# Patient Record
Sex: FEMALE | Race: WHITE | NOT HISPANIC OR LATINO | Employment: FULL TIME | URBAN - METROPOLITAN AREA
[De-identification: names, ages, dates, MRNs, and addresses within clinical notes are randomized per-mention and may not be internally consistent; named-entity substitution may affect disease eponyms.]

---

## 2017-04-04 ENCOUNTER — HOSPITAL ENCOUNTER (OUTPATIENT)
Dept: EMERGENCY MEDICINE | Facility: HOSPITAL | Age: 42
End: 2017-04-05
Attending: INTERNAL MEDICINE | Admitting: INTERNAL MEDICINE

## 2017-08-14 ENCOUNTER — HISTORICAL (OUTPATIENT)
Dept: WOUND CARE | Facility: HOSPITAL | Age: 42
End: 2017-08-14

## 2017-08-14 LAB
APPEARANCE, UA: CLEAR
BACTERIA #/AREA URNS AUTO: ABNORMAL /[HPF]
BILIRUB UR QL STRIP: NEGATIVE
COLOR UR: YELLOW
GLUCOSE (UA): NORMAL
HAV IGM SERPL QL IA: NONREACTIVE
HBV CORE IGM SERPL QL IA: NONREACTIVE
HBV SURFACE AG SERPL QL IA: NEGATIVE
HCV AB SERPL QL IA: NONREACTIVE
HGB UR QL STRIP: 1 MG/DL
HIV 1+2 AB+HIV1 P24 AG SERPL QL IA: NONREACTIVE
HYALINE CASTS #/AREA URNS LPF: ABNORMAL /[LPF]
KETONES UR QL STRIP: NEGATIVE
LEUKOCYTE ESTERASE UR QL STRIP: 250 LEU/UL
NITRITE UR QL STRIP: NEGATIVE
PH UR STRIP: 6.5 [PH] (ref 4.5–8)
PROT UR QL STRIP: 10 MG/DL
RBC #/AREA URNS AUTO: ABNORMAL /[HPF]
SP GR UR STRIP: 1.02 (ref 1–1.03)
SQUAMOUS #/AREA URNS LPF: >100 /[LPF]
T PALLIDUM AB SER QL: NONREACTIVE
UROBILINOGEN UR STRIP-ACNC: NORMAL
WBC #/AREA URNS AUTO: ABNORMAL /HPF

## 2017-08-20 LAB
COLOR STL: NORMAL
CONSISTENCY STL: NORMAL
HEMOCCULT SP1 STL QL: NEGATIVE

## 2017-08-23 LAB
COLOR STL: NORMAL
CONSISTENCY STL: NORMAL
HEMOCCULT SP2 STL QL: NEGATIVE

## 2017-08-24 ENCOUNTER — HISTORICAL (OUTPATIENT)
Dept: WOUND CARE | Facility: HOSPITAL | Age: 42
End: 2017-08-24

## 2017-08-24 LAB
COLOR STL: NORMAL
CONSISTENCY STL: NORMAL

## 2017-09-01 ENCOUNTER — HISTORICAL (OUTPATIENT)
Dept: WOUND CARE | Facility: HOSPITAL | Age: 42
End: 2017-09-01

## 2017-09-07 ENCOUNTER — HISTORICAL (OUTPATIENT)
Dept: WOUND CARE | Facility: HOSPITAL | Age: 42
End: 2017-09-07

## 2017-09-07 LAB
APPEARANCE, UA: ABNORMAL
BACTERIA #/AREA URNS AUTO: ABNORMAL /[HPF]
BILIRUB UR QL STRIP: NEGATIVE
COLOR UR: YELLOW
GLUCOSE (UA): NORMAL
HGB UR QL STRIP: 0.06 MG/DL
HYALINE CASTS #/AREA URNS LPF: ABNORMAL /[LPF]
KETONES UR QL STRIP: NEGATIVE
LEUKOCYTE ESTERASE UR QL STRIP: 500 LEU/UL
NITRITE UR QL STRIP: NEGATIVE
PH UR STRIP: 6 [PH] (ref 4.5–8)
PROT UR QL STRIP: NEGATIVE
RBC #/AREA URNS AUTO: ABNORMAL /[HPF]
SP GR UR STRIP: 1.01 (ref 1–1.03)
SQUAMOUS #/AREA URNS LPF: >100 /[LPF]
UROBILINOGEN UR STRIP-ACNC: NORMAL
WBC #/AREA URNS AUTO: ABNORMAL /HPF

## 2017-09-10 LAB — FINAL CULTURE: NORMAL

## 2017-09-18 ENCOUNTER — HISTORICAL (OUTPATIENT)
Dept: ADMINISTRATIVE | Facility: HOSPITAL | Age: 42
End: 2017-09-18

## 2017-09-18 LAB
APPEARANCE, UA: CLEAR
BACTERIA #/AREA URNS AUTO: ABNORMAL /[HPF]
BILIRUB SERPL-MCNC: NEGATIVE MG/DL
BILIRUB UR QL STRIP: NEGATIVE
BLOOD URINE, POC: NEGATIVE
CLARITY, POC UA: CLEAR
COLOR UR: NORMAL
COLOR, POC UA: NORMAL
GLUCOSE (UA): NORMAL
GLUCOSE UR QL STRIP: NEGATIVE
HGB UR QL STRIP: NEGATIVE
HYALINE CASTS #/AREA URNS LPF: ABNORMAL /[LPF]
KETONES UR QL STRIP: NEGATIVE
KETONES UR QL STRIP: NEGATIVE
LEUKOCYTE EST, POC UA: NEGATIVE
LEUKOCYTE ESTERASE UR QL STRIP: NEGATIVE
NITRITE UR QL STRIP: NEGATIVE
NITRITE, POC UA: NEGATIVE
PH UR STRIP: 7.5 [PH] (ref 4.5–8)
PH, POC UA: 6
PROT UR QL STRIP: NEGATIVE
PROTEIN, POC: NEGATIVE
RBC #/AREA URNS AUTO: ABNORMAL /[HPF]
SP GR UR STRIP: 1.01 (ref 1–1.03)
SPECIFIC GRAVITY, POC UA: 1
SQUAMOUS #/AREA URNS LPF: ABNORMAL /[LPF]
UROBILINOGEN UR STRIP-ACNC: NORMAL
UROBILINOGEN, POC UA: NORMAL
WBC #/AREA URNS AUTO: ABNORMAL /HPF

## 2017-09-20 LAB — FINAL CULTURE: NO GROWTH

## 2017-09-21 ENCOUNTER — HISTORICAL (OUTPATIENT)
Dept: WOUND CARE | Facility: HOSPITAL | Age: 42
End: 2017-09-21

## 2017-09-21 LAB — FSH SERPL-ACNC: 22.1 MIU/ML

## 2017-09-24 LAB
BILIRUB SERPL-MCNC: NEGATIVE MG/DL
BLOOD URINE, POC: NORMAL
CLARITY, POC UA: CLEAR
COLOR, POC UA: NORMAL
GLUCOSE UR QL STRIP: NEGATIVE
KETONES UR QL STRIP: NEGATIVE
LEUKOCYTE EST, POC UA: NORMAL
NITRITE, POC UA: POSITIVE
PH, POC UA: 6
PROTEIN, POC: NORMAL
SPECIFIC GRAVITY, POC UA: 1.01
UROBILINOGEN, POC UA: NORMAL

## 2017-12-11 ENCOUNTER — HISTORICAL (OUTPATIENT)
Dept: INTERNAL MEDICINE | Facility: CLINIC | Age: 42
End: 2017-12-11

## 2017-12-11 LAB
ABS NEUT (OLG): 3.2 X10(3)/MCL (ref 2.1–9.2)
ALBUMIN SERPL-MCNC: 3.7 GM/DL (ref 3.4–5)
ALBUMIN/GLOB SERPL: 1 RATIO (ref 1–2)
ALP SERPL-CCNC: 57 UNIT/L (ref 45–117)
ALT SERPL-CCNC: 29 UNIT/L (ref 12–78)
AST SERPL-CCNC: 15 UNIT/L (ref 15–37)
BASOPHILS # BLD AUTO: 0.05 X10(3)/MCL
BASOPHILS NFR BLD AUTO: 1 % (ref 0–1)
BILIRUB SERPL-MCNC: 0.5 MG/DL (ref 0.2–1)
BILIRUBIN DIRECT+TOT PNL SERPL-MCNC: 0.1 MG/DL
BILIRUBIN DIRECT+TOT PNL SERPL-MCNC: 0.4 MG/DL
BUN SERPL-MCNC: 15 MG/DL (ref 7–18)
CALCIUM SERPL-MCNC: 8.8 MG/DL (ref 8.5–10.1)
CHLORIDE SERPL-SCNC: 108 MMOL/L (ref 98–107)
CHOLEST SERPL-MCNC: 150 MG/DL
CHOLEST/HDLC SERPL: 3.3 {RATIO} (ref 0–4.4)
CO2 SERPL-SCNC: 25 MMOL/L (ref 21–32)
CREAT SERPL-MCNC: 0.9 MG/DL (ref 0.6–1.3)
EOSINOPHIL # BLD AUTO: 0.14 10*3/UL
EOSINOPHIL NFR BLD AUTO: 2 % (ref 0–5)
ERYTHROCYTE [DISTWIDTH] IN BLOOD BY AUTOMATED COUNT: 12 % (ref 11.5–14.5)
EST. AVERAGE GLUCOSE BLD GHB EST-MCNC: 123 MG/DL
GLOBULIN SER-MCNC: 3.4 GM/ML (ref 2.3–3.5)
GLUCOSE SERPL-MCNC: 122 MG/DL (ref 74–106)
HBA1C MFR BLD: 5.9 % (ref 4.2–6.3)
HCT VFR BLD AUTO: 41.1 % (ref 35–46)
HDLC SERPL-MCNC: 45 MG/DL
HGB BLD-MCNC: 14 GM/DL (ref 12–16)
IMM GRANULOCYTES # BLD AUTO: 0.01 10*3/UL
IMM GRANULOCYTES NFR BLD AUTO: 0 %
LDLC SERPL CALC-MCNC: 85 MG/DL (ref 0–130)
LYMPHOCYTES # BLD AUTO: 3.03 X10(3)/MCL
LYMPHOCYTES NFR BLD AUTO: 44 % (ref 15–40)
MCH RBC QN AUTO: 31 PG (ref 26–34)
MCHC RBC AUTO-ENTMCNC: 34.1 GM/DL (ref 31–37)
MCV RBC AUTO: 90.9 FL (ref 80–100)
MONOCYTES # BLD AUTO: 0.48 X10(3)/MCL
MONOCYTES NFR BLD AUTO: 7 % (ref 4–12)
NEUTROPHILS # BLD AUTO: 3.2 X10(3)/MCL
NEUTROPHILS NFR BLD AUTO: 46 X10(3)/MCL
PLATELET # BLD AUTO: 192 X10(3)/MCL (ref 130–400)
PMV BLD AUTO: 10.6 FL (ref 7.4–10.4)
POTASSIUM SERPL-SCNC: 4 MMOL/L (ref 3.5–5.1)
PROT SERPL-MCNC: 7.1 GM/DL (ref 6.4–8.2)
RBC # BLD AUTO: 4.52 X10(6)/MCL (ref 4–5.2)
SODIUM SERPL-SCNC: 142 MMOL/L (ref 136–145)
TRIGL SERPL-MCNC: 100 MG/DL
VLDLC SERPL CALC-MCNC: 20 MG/DL
WBC # SPEC AUTO: 6.9 X10(3)/MCL (ref 4.5–11)

## 2018-02-26 ENCOUNTER — HISTORICAL (OUTPATIENT)
Dept: ADMINISTRATIVE | Facility: HOSPITAL | Age: 43
End: 2018-02-26

## 2018-02-26 LAB
APPEARANCE, UA: CLEAR
BACTERIA #/AREA URNS AUTO: ABNORMAL /[HPF]
BILIRUB SERPL-MCNC: NEGATIVE MG/DL
BILIRUB UR QL STRIP: NEGATIVE
BLOOD URINE, POC: NORMAL
CLARITY, POC UA: CLEAR
COLOR UR: ABNORMAL
COLOR, POC UA: YELLOW
GLUCOSE (UA): NORMAL
GLUCOSE UR QL STRIP: NEGATIVE
HGB UR QL STRIP: 0.03 MG/DL
HYALINE CASTS #/AREA URNS LPF: ABNORMAL /[LPF]
KETONES UR QL STRIP: ABNORMAL
KETONES UR QL STRIP: NEGATIVE
LEUKOCYTE EST, POC UA: NORMAL
LEUKOCYTE ESTERASE UR QL STRIP: 25 LEU/UL
NITRITE UR QL STRIP: NEGATIVE
NITRITE, POC UA: NEGATIVE
PH UR STRIP: 6.5 [PH] (ref 4.5–8)
PH, POC UA: 7
PROT UR QL STRIP: NEGATIVE
PROTEIN, POC: NEGATIVE
RBC #/AREA URNS AUTO: ABNORMAL /[HPF]
SP GR UR STRIP: 1.01 (ref 1–1.03)
SPECIFIC GRAVITY, POC UA: 1.02
SQUAMOUS #/AREA URNS LPF: ABNORMAL /[LPF]
UROBILINOGEN UR STRIP-ACNC: NORMAL
UROBILINOGEN, POC UA: NORMAL
WBC #/AREA URNS AUTO: ABNORMAL /HPF

## 2018-02-28 LAB — FINAL CULTURE: NORMAL

## 2018-05-23 LAB — POC BETA-HCG (QUAL): NEGATIVE

## 2018-09-20 ENCOUNTER — HISTORICAL (OUTPATIENT)
Dept: ADMINISTRATIVE | Facility: HOSPITAL | Age: 43
End: 2018-09-20

## 2018-09-20 LAB
AMPHET UR QL SCN: NEGATIVE
BARBITURATE SCN PRESENT UR: NEGATIVE
BENZODIAZ UR QL SCN: NEGATIVE
CANNABINOIDS UR QL SCN: NEGATIVE
COCAINE UR QL SCN: NEGATIVE
OPIATES UR QL SCN: NEGATIVE
PCP UR QL: NEGATIVE
PH UR STRIP.AUTO: 6 [PH] (ref 5–8)
TEMPERATURE, URINE (OHS): 21.8 DEGC (ref 20–25)

## 2019-01-14 ENCOUNTER — HISTORICAL (OUTPATIENT)
Dept: ADMINISTRATIVE | Facility: HOSPITAL | Age: 44
End: 2019-01-14

## 2019-01-14 LAB
FSH SERPL-ACNC: 12.4 MIU/ML
POC BETA-HCG (QUAL): NEGATIVE

## 2019-01-29 ENCOUNTER — HISTORICAL (OUTPATIENT)
Dept: WOUND CARE | Facility: HOSPITAL | Age: 44
End: 2019-01-29

## 2019-01-29 LAB — B-HCG SERPL QL: NEGATIVE

## 2020-07-10 ENCOUNTER — HISTORICAL (OUTPATIENT)
Dept: ADMINISTRATIVE | Facility: HOSPITAL | Age: 45
End: 2020-07-10

## 2020-07-13 LAB — FINAL CULTURE: NO GROWTH

## 2020-09-25 ENCOUNTER — HISTORICAL (OUTPATIENT)
Dept: ADMINISTRATIVE | Facility: HOSPITAL | Age: 45
End: 2020-09-25

## 2020-09-28 LAB — FINAL CULTURE: NO GROWTH

## 2020-10-05 ENCOUNTER — HISTORICAL (OUTPATIENT)
Dept: ADMINISTRATIVE | Facility: HOSPITAL | Age: 45
End: 2020-10-05

## 2020-10-08 LAB — FINAL CULTURE: NO GROWTH

## 2021-03-12 ENCOUNTER — HISTORICAL (OUTPATIENT)
Dept: LAB | Facility: HOSPITAL | Age: 46
End: 2021-03-12

## 2021-03-12 LAB
ABS NEUT (OLG): 2.74 X10(3)/MCL (ref 2.1–9.2)
ALBUMIN SERPL-MCNC: 4.4 GM/DL (ref 3.5–5)
ALBUMIN/GLOB SERPL: 1.4 RATIO (ref 1.1–2)
ALP SERPL-CCNC: 62 UNIT/L (ref 40–150)
ALT SERPL-CCNC: 21 UNIT/L (ref 0–55)
APPEARANCE, UA: CLEAR
AST SERPL-CCNC: 16 UNIT/L (ref 5–34)
BACTERIA SPEC CULT: ABNORMAL /HPF
BASOPHILS # BLD AUTO: 0.06 X10(3)/MCL (ref 0–0.2)
BASOPHILS NFR BLD AUTO: 1 % (ref 0–1)
BILIRUB SERPL-MCNC: 0.6 MG/DL (ref 0.2–1.2)
BILIRUB UR QL STRIP: NEGATIVE
BILIRUBIN DIRECT+TOT PNL SERPL-MCNC: 0.2 MG/DL (ref 0–0.5)
BILIRUBIN DIRECT+TOT PNL SERPL-MCNC: 0.4 MG/DL (ref 0–0.8)
BUN SERPL-MCNC: 10.8 MG/DL (ref 7–18.7)
CALCIUM SERPL-MCNC: 9.6 MG/DL (ref 8.4–10.2)
CHLORIDE SERPL-SCNC: 106 MMOL/L (ref 98–107)
CHOLEST SERPL-MCNC: 178 MG/DL
CHOLEST/HDLC SERPL: 4 {RATIO} (ref 0–5)
CO2 SERPL-SCNC: 27 MMOL/L (ref 22–29)
COLOR UR: ABNORMAL
CREAT SERPL-MCNC: 0.9 MG/DL (ref 0.57–1.11)
CREAT UR-MCNC: 90.9 MG/DL (ref 45–106)
DEPRECATED CALCIDIOL+CALCIFEROL SERPL-MC: 53.6 NG/ML (ref 30–80)
EOSINOPHIL # BLD AUTO: 0.08 X10(3)/MCL (ref 0–0.9)
EOSINOPHIL NFR BLD AUTO: 1.3 % (ref 0–6.4)
ERYTHROCYTE [DISTWIDTH] IN BLOOD BY AUTOMATED COUNT: 12.1 % (ref 11.5–17)
EST. AVERAGE GLUCOSE BLD GHB EST-MCNC: 131.2 MG/DL
GLOBULIN SER-MCNC: 3.1 GM/DL (ref 2.4–3.5)
GLUCOSE (UA): NEGATIVE
GLUCOSE SERPL-MCNC: 119 MG/DL (ref 74–100)
HBA1C MFR BLD: 6.2 %
HCT VFR BLD AUTO: 43.3 % (ref 37–47)
HDLC SERPL-MCNC: 40 MG/DL (ref 40–60)
HGB BLD-MCNC: 14.4 GM/DL (ref 12–16)
HGB UR QL STRIP: ABNORMAL
IMM GRANULOCYTES # BLD AUTO: 0.01 10*3/UL (ref 0–0.02)
IMM GRANULOCYTES NFR BLD AUTO: 0.2 % (ref 0–0.43)
KETONES UR QL STRIP: NEGATIVE
LDLC SERPL CALC-MCNC: 122 MG/DL (ref 50–140)
LEUKOCYTE ESTERASE UR QL STRIP: ABNORMAL
LYMPHOCYTES # BLD AUTO: 2.71 X10(3)/MCL (ref 0.6–4.6)
LYMPHOCYTES NFR BLD AUTO: 45.5 % (ref 16–44)
MCH RBC QN AUTO: 30.4 PG (ref 27–31)
MCHC RBC AUTO-ENTMCNC: 33.3 GM/DL (ref 33–36)
MCV RBC AUTO: 91.5 FL (ref 80–94)
MICROALBUMIN UR-MCNC: <5 UG/ML
MICROALBUMIN/CREAT RATIO PNL UR: <5.5 MG/GM CR (ref 0–30)
MONOCYTES # BLD AUTO: 0.36 X10(3)/MCL (ref 0.1–1.3)
MONOCYTES NFR BLD AUTO: 6 % (ref 4–12.1)
MUCOUS THREADS URNS QL MICRO: ABNORMAL /LPF
NEUTROPHILS # BLD AUTO: 2.74 X10(3)/MCL (ref 2.1–9.2)
NEUTROPHILS NFR BLD AUTO: 46 % (ref 43–73)
NITRITE UR QL STRIP: NEGATIVE
NRBC BLD AUTO-RTO: 0 % (ref 0–0.2)
PH UR STRIP: 7 [PH] (ref 5–7)
PLATELET # BLD AUTO: 224 X10(3)/MCL (ref 130–400)
PMV BLD AUTO: 10.1 FL (ref 7.4–10.4)
POTASSIUM SERPL-SCNC: 3.9 MMOL/L (ref 3.5–5.1)
PROT SERPL-MCNC: 7.5 GM/DL (ref 6.4–8.3)
PROT UR QL STRIP: NEGATIVE
RBC # BLD AUTO: 4.73 X10(6)/MCL (ref 4.2–5.4)
RBC #/AREA URNS HPF: ABNORMAL /HPF
SODIUM SERPL-SCNC: 140 MMOL/L (ref 136–145)
SP GR UR STRIP: 1.02 (ref 1–1.03)
SQUAMOUS EPITHELIAL, UA: ABNORMAL /LPF
TRIGL SERPL-MCNC: 80 MG/DL (ref 0–150)
TSH SERPL-ACNC: 1.46 UIU/ML (ref 0.35–4.94)
UROBILINOGEN UR STRIP-ACNC: NEGATIVE
VLDLC SERPL CALC-MCNC: 16 MG/DL
WBC # SPEC AUTO: 6 X10(3)/MCL (ref 4.5–11.5)
WBC #/AREA URNS HPF: ABNORMAL /HPF

## 2021-03-14 LAB — FINAL CULTURE: NORMAL

## 2021-06-22 ENCOUNTER — HISTORICAL (OUTPATIENT)
Dept: ADMINISTRATIVE | Facility: HOSPITAL | Age: 46
End: 2021-06-22

## 2021-06-22 LAB
APPEARANCE, UA: ABNORMAL
BACTERIA SPEC CULT: ABNORMAL /HPF
BILIRUB SERPL-MCNC: NEGATIVE MG/DL
BILIRUB UR QL STRIP: NEGATIVE
BLOOD URINE, POC: NORMAL
CLARITY, POC UA: NORMAL
COLOR UR: YELLOW
COLOR, POC UA: NORMAL
GLUCOSE (UA): NEGATIVE
GLUCOSE UR QL STRIP: NEGATIVE
HGB UR QL STRIP: ABNORMAL
KETONES UR QL STRIP: ABNORMAL
KETONES UR QL STRIP: NEGATIVE
LEUKOCYTE EST, POC UA: NORMAL
LEUKOCYTE ESTERASE UR QL STRIP: ABNORMAL
NITRITE UR QL STRIP: POSITIVE
NITRITE, POC UA: POSITIVE
PH UR STRIP: 6 [PH] (ref 5–9)
PH, POC UA: 5
PROT UR QL STRIP: ABNORMAL
PROTEIN, POC: NORMAL
RBC #/AREA URNS HPF: ABNORMAL /HPF
SP GR UR STRIP: >1.03 (ref 1–1.03)
SPECIFIC GRAVITY, POC UA: 1.03
SQUAMOUS EPITHELIAL, UA: ABNORMAL /HPF
UROBILINOGEN UR STRIP-ACNC: 0.2
UROBILINOGEN, POC UA: NORMAL
WBC #/AREA URNS HPF: ABNORMAL /HPF

## 2021-06-25 LAB
HPV16+18+H RISK 12 DNA CVX-IMP: NEGATIVE
PAP RECOMMENDATION EXT: NORMAL
PAP SMEAR: NORMAL

## 2021-07-03 LAB
BILIRUB SERPL-MCNC: NORMAL MG/DL
BLOOD URINE, POC: NORMAL
CLARITY, POC UA: NORMAL
COLOR, POC UA: NORMAL
GLUCOSE UR QL STRIP: NEGATIVE
KETONES UR QL STRIP: NEGATIVE
LEUKOCYTE EST, POC UA: NORMAL
NITRITE, POC UA: POSITIVE
PH, POC UA: 6
PROTEIN, POC: NEGATIVE
SPECIFIC GRAVITY, POC UA: 1.01
UROBILINOGEN, POC UA: NORMAL

## 2021-10-13 ENCOUNTER — HISTORICAL (OUTPATIENT)
Dept: RADIOLOGY | Facility: HOSPITAL | Age: 46
End: 2021-10-13

## 2022-04-11 ENCOUNTER — HISTORICAL (OUTPATIENT)
Dept: ADMINISTRATIVE | Facility: HOSPITAL | Age: 47
End: 2022-04-11
Payer: COMMERCIAL

## 2022-04-28 VITALS
OXYGEN SATURATION: 100 % | HEIGHT: 67 IN | DIASTOLIC BLOOD PRESSURE: 87 MMHG | WEIGHT: 244.19 LBS | BODY MASS INDEX: 38.33 KG/M2 | SYSTOLIC BLOOD PRESSURE: 138 MMHG | DIASTOLIC BLOOD PRESSURE: 84 MMHG | OXYGEN SATURATION: 99 % | WEIGHT: 240.31 LBS | SYSTOLIC BLOOD PRESSURE: 136 MMHG | BODY MASS INDEX: 37.72 KG/M2 | WEIGHT: 246.69 LBS | DIASTOLIC BLOOD PRESSURE: 72 MMHG | HEIGHT: 67 IN | SYSTOLIC BLOOD PRESSURE: 122 MMHG | HEIGHT: 67 IN | BODY MASS INDEX: 38.72 KG/M2

## 2022-04-30 NOTE — PROGRESS NOTES
Patient:   Karen Tapia             MRN: [id number]            FIN: [id number]               Age:   42 years     Sex:  Female     :  1975   Associated Diagnoses:   None   Author:   Griselda Edwards MD      Attempt made to call pt regarding FSH which shows she is not post-menopausal. There was no answer. Message left with call back number.     Griselda Edwards MD [id number]   Pager [phone]

## 2022-05-04 NOTE — HISTORICAL [facility]
This is a historical note converted from Jeri. Formatting and pictures may have been removed.  Please reference Jeri for original formatting and attached multimedia. Chief Complaint  blood in urine, has family history of cancer  History of Present Illness  43yo  presents as referral for recurrent microscopic ?hematuria on clean catch UA?for the past two years. Of note pt reports no bladder pan, dysuria or incontinence. She has DTF 15 x per day and NTF 2-3 times per day. Reports that urine is sometimes dark. She has a remote hx of abnormal pap smears. ??She is amenorrhic on Depo for many years and is not sexuallya ctive, endosring no vaginal pain. Of note, u dip today is negative for blood. Pt is a non smoker and? denies any family hx of breast, uterine,?or bladder cancer. ?Family hx of colon cancer in sister from prior note but today reports that it was her mother who had cancer, the type of which she is uncertain but she thinks it may have been ovarian. Reports occasional hot flashes and that her sister and mom went through menopause in their 40s.  ?   Mhx: Chronic back pain, DM, HTN  Sx: CS x 3  Current meds: Depo  Social: Denies x 3, works as .  Allergies: Codine.  OB: CS x 3, eldest child is .  ?  Review of Systems  Review of Systems  Constitutional: No fever, No chills.  Respiratory: No shortness of breath.  Cardiovascular: No chest pain, No syncope.  Gastrointestinal: No nausea, No vomiting, No diarrhea, No constipation.  Genitourinary: No dysuria, No abnormal discharge or bleeding.  Neurologic: Alert and oriented X4.  Physical Exam  General: Alert and oriented, No acute distress.  Breast: No mass, No tenderness, No discharge.  Gastrointestinal: Soft, Non-tender.  Gynecology:  There is no vaginal, trigonal or vestibular pain.  Labia: Bilateral, Majora, Minora, Within normal limits.  Vagina: Within normal limits. clear discharge present  Cervix: agglutinated at the top of the  vagina.  Uterus: Mobile, Not tender.  Ovaries: Not tender, No mass.  No evidence of prolapse.  Integumentary: Warm, Dry.  Neurologic: Alert, Oriented.  Psychiatric: Cooperative, Appropriate mood & affect.  Assessment/Plan  1.?Hematuria  ? No evidence of hematuria today and prior UAs have been clean catch. Today, cath specimen was collected. If positive for blood on UA, needs CT and cystoscopy. If negative, likely needs estrogenization of the urethral and?vaginal tissue.? Discussed lifestyle modifications for urinary frequency as pt is not terribly bothered by it.  2.?Vaginal atrophy  ? Cervix is agglutinated and vaginal?mucosa strophic; suspect that pt my be?menopausal. Will order FSH to confirm as if she is, needs to discontinue depo provera for maintence of bone density. Will start premarin 3 x per week x 2 weeks and then 2 times weekly thereafter. Pt may RTC prn. ??   Problem List/Past Medical History  Ongoing  Obesity  Historical  Diabetes  Procedure/Surgical History   section ()   section ()   section ()  Medications  Inpatient  Depo-Provera 150 mg/mL intramuscular suspension, 150 mg= 1 mL, IM, Once  Depo-Provera 150 mg/mL intramuscular suspension, 150 mg= 1 mL, IM, Once  Depo-Provera 150 mg/mL intramuscular suspension, 150 mg= 1 mL, IM, Once  Home  Premarin 0.625 mg/g vaginal cream with applicator, 1 gm, VAG, qPM, 2 refills  Allergies  codeine  Social History  Alcohol - Denies Alcohol Use, 2017  Never  Employment/School - 2017  Employed  Home/Environment - 2017  Lives with Children. Injuries/Abuse/Neglect in household: No. Feels unsafe at home: No.  Nutrition/Health - 2017  Regular  Sexual - 2017  Sexually active: No. Sexually active at age 15 Years. Number of current partners 0. Number of lifetime partners 5. Sexual orientation: Heterosexual. History of sexual abuse: No.  Substance Abuse - Denies Substance Abuse, 2017  Never  Tobacco -  Denies Tobacco Use, 09/18/2017  Never smoker  Family History  Diabetes: Mother.  Heart disease: Mother.  Hypertension.: Mother and Father.  Metastatic cancer: Mother.  Primary malignant neoplasm of colon: Sister.

## 2022-05-04 NOTE — HISTORICAL [facility]
This is a historical note converted from Cerdat. Formatting and pictures may have been removed.  Please reference Jeri for original formatting and attached multimedia. Chief Complaint  c/o right flank pain, nausea, fever. symptoms x 5 days  History of Present Illness  43-year-old female with history of UTIs,?complaining of 5 days of mild nausea, occasional right flank pain, low-grade temp,?mild dysuria,?no increased frequency.? States she has noticed blood in her urine in the past but not recently.? Denies vomiting or diarrhea, no cardiorespiratory symptoms, no ENT symptoms.? No vaginal bleeding?or discharge.  Review of Systems  Constitutional: negative except as stated in HPI  Eye: negative except as stated in HPI  ENT: negative except as stated in HPI  Respiratory: negative except as stated in HPI  Cardiovascular: negative except as stated in HPI  Gastrointestinal: negative except as stated in HPI  Genitourinary: negative except as stated in HPI  Physical Exam  Vitals & Measurements  T:?37.1? ?C (Oral)? HR:?80(Peripheral)? BP:?122/72? SpO2:?100%?  HT:?170?cm? HT:?170?cm? WT:?111.9?kg? WT:?111.9?kg? BMI:?38.72?  VITAL SIGNS: ?Reviewed. ? ?  GENERAL:?In no apparent distress  NECK:?No adenopathy, no JVD??  CHEST:?Clear breath sounds bilaterally. ?No wheezes  CARDIAC:?Regular rate and rhythm  ABDOMEN:?Soft, nontender, nondistended, no rebound or guarding, no masses palpated  MUSCULOSKELETAL: No chest wall tenderness or rash. ?No CVA tenderness.  NEUROLOGIC EXAM:?Alert and oriented x 3. ?No focal sensory or strength deficits. ? Speech normal. ?Follows commands  PSYCHIATRIC:?Mood normal  SKIN:?No visualized rash  ?  Lab/Xray:  Urine dip-trace blood, trace leukocyte esterase  ?  Procedure:  ?  ?  Assessment/Plan  1.?Acute UTI  ?Will treat as UTI.? Patient had Klebsiella?in September of last year which was sensitive to Cipro.? Prescribed Cipro, increase fluids,?sent urine culture.? Encouraged her to keep?internal  medicine follow-up on 3/13.? Return to urgent care if not improving in 2-3 days or any worsening.  Ordered:  Urinalysis with Microscopic if Indicated, Stat collect, Urine, 18 17:43:00 CST, Stop date 18 17:44:00 CST, Nurse collect, Acute UTI, 18 17:43:00 CST  Urine Culture [id number], Stat collect, 18 17:43:00 CST, Urine, Nurse collect, Stop date 18 17:44:00 CST, Acute UTI  ?  Orders:  ciprofloxacin, 250 mg = 1 tab(s), Oral, q12hr, X 3 day(s), # 6 tab(s), 0 Refill(s), Pharmacy: [id number]   Problem List/Past Medical History  Ongoing  Obesity  Historical  Diabetes  Procedure/Surgical History   section (),  section (),  section ().  Medications  Cipro 250 mg oral tablet, 250 mg= 1 tab(s), Oral, q12hr  lisinopril 5 mg oral tablet, 5 mg= 1 tab(s), Oral, Daily, 3 refills  Premarin 0.625 mg/g vaginal cream with applicator, 1 gm, VAG, qPM, 2 refills  Vitamin B12 with Folic Acid sublingual tablet, 1 tab(s), SL, Daily  Allergies  codeine  Social History  Alcohol - Denies Alcohol Use, 2014  Never, 2018  Employment/School  Employed, 2015  Home/Environment  Lives with Children. Living situation: Home/Independent. Alcohol abuse in household: No. Substance abuse in household: No. Smoker in household: No. Injuries/Abuse/Neglect in household: No. Feels unsafe at home: No. Safe place to go: Yes. Family/Friends available for support: Yes. Concern for family members at home: No. Major illness in household: No., 2017  Nutrition/Health  Regular, Wants to lose weight: Yes. Sleeping concerns: No. Feels highly stressed: No., 2017  Sexual  Sexually active: No. Sexually active at age 15 Years. Number of current partners 0. Number of lifetime partners 5. Sexual orientation: Heterosexual. History of sexual abuse: No., 2017  Substance Abuse - Denies Substance Abuse, 2014  Never, 2018  Tobacco - Denies Tobacco Use,  2014  Never smoker, 2018  Family History  : Mother.  Diabetes: Mother.  Heart disease: Mother.  Hypertension.: Mother and Father.  Metastatic cancer: Mother.  Pancreatitis.: Mother.  Primary malignant neoplasm of colon: Sister.  Immunizations  Vaccine Date Status   influenza virus vaccine, inactivated 2017 Recorded   influenza virus vaccine, inactivated 12/15/2016 Given   Lab Results  Test Name Test Result Date/Time   Urine Color Urine Dipstick Yellow 2018 17:20 CST   Urine Appearance Urine Dipstick Clear 2018 17:20 CST   pH Urine Dipstick 7 2018 17:20 CST   Specific Gravity Urine Dipstick 1.020 2018 17:20 CST   Blood Urine Dipstick Trace 2018 17:20 CST   Glucose Urine Dipstick Negative 2018 17:20 CST   Ketones Urine Dipstick Negative 2018 17:20 CST   Protein Urine Dipstick Negative 2018 17:20 CST   Bilirubin Urine Dipstick Negative 2018 17:20 CST   Urobilinogen Urine Dipstick 0.2 mg/dl 2018 17:20 CST   Leukocytes Urine Dipstick Trace 2018 17:20 CST   Nitrite Urine Dipstick Negative 2018 17:20 CST

## 2022-09-01 LAB — CRC RECOMMENDATION EXT: NORMAL

## 2022-09-21 ENCOUNTER — HISTORICAL (OUTPATIENT)
Dept: ADMINISTRATIVE | Facility: HOSPITAL | Age: 47
End: 2022-09-21
Payer: COMMERCIAL

## 2022-10-11 ENCOUNTER — OFFICE VISIT (OUTPATIENT)
Dept: URGENT CARE | Facility: CLINIC | Age: 47
End: 2022-10-11
Payer: COMMERCIAL

## 2022-10-11 ENCOUNTER — HOSPITAL ENCOUNTER (OUTPATIENT)
Dept: RADIOLOGY | Facility: HOSPITAL | Age: 47
Discharge: HOME | End: 2022-10-11
Attending: NURSE PRACTITIONER
Payer: COMMERCIAL

## 2022-10-11 VITALS
WEIGHT: 245.63 LBS | SYSTOLIC BLOOD PRESSURE: 136 MMHG | OXYGEN SATURATION: 99 % | RESPIRATION RATE: 18 BRPM | HEIGHT: 67 IN | DIASTOLIC BLOOD PRESSURE: 90 MMHG | TEMPERATURE: 99 F | HEART RATE: 64 BPM | BODY MASS INDEX: 38.55 KG/M2

## 2022-10-11 DIAGNOSIS — R07.81 RIB PAIN ON RIGHT SIDE: Primary | ICD-10-CM
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
## 2022-10-11 PROCEDURE — 99213 OFFICE O/P EST LOW 20 MIN: CPT | Mod: PBBFAC | Performed by: NURSE PRACTITIONER

## 2022-10-11 PROCEDURE — 99213 OFFICE O/P EST LOW 20 MIN: CPT | Mod: S$PBB,,, | Performed by: NURSE PRACTITIONER

## 2022-10-11 PROCEDURE — 99213 PR OFFICE/OUTPT VISIT, EST, LEVL III, 20-29 MIN: ICD-10-PCS | Mod: S$PBB,,, | Performed by: NURSE PRACTITIONER

## 2022-10-11 PROCEDURE — 63600175 PHARM REV CODE 636 W HCPCS: Performed by: NURSE PRACTITIONER

## 2022-10-11 RX ORDER — DICLOFENAC SODIUM 75 MG/1
75 TABLET, DELAYED RELEASE ORAL 2 TIMES DAILY
Qty: 60 TABLET | Refills: 0 | Status: SHIPPED | OUTPATIENT
Start: 2022-10-11 | End: 2022-11-10

## 2022-10-11 RX ORDER — METHOCARBAMOL 750 MG/1
750 TABLET, FILM COATED ORAL 3 TIMES DAILY PRN
Qty: 15 TABLET | Refills: 0 | Status: SHIPPED | OUTPATIENT
Start: 2022-10-11 | End: 2022-10-16

## 2022-10-11 RX ORDER — KETOROLAC TROMETHAMINE 30 MG/ML
60 INJECTION, SOLUTION INTRAMUSCULAR; INTRAVENOUS
Status: COMPLETED | OUTPATIENT
Start: 2022-10-11 | End: 2022-10-11

## 2022-10-11 RX ADMIN — KETOROLAC TROMETHAMINE 60 MG: 60 INJECTION, SOLUTION INTRAMUSCULAR at 03:10

## 2022-10-11 NOTE — PROGRESS NOTES
"Subjective:       Patient ID: Karen Tapia is a 47 y.o. female.    Vitals:  height is 5' 6.54" (1.69 m) and weight is 111.4 kg (245 lb 9.6 oz). Her oral temperature is 99 °F (37.2 °C). Her blood pressure is 136/90 (abnormal) and her pulse is 64. Her respiration is 18 and oxygen saturation is 99%.     Chief Complaint: Chest Pain (Hand slipped and hit bathtub this morning)    Patient is a 47-year-old female, here today after a fall while getting out the bathtub.  Patient states today she was pushing to get out of her bathtub when her hand slipped and she hit her right rib area.      Constitution: Negative.   Cardiovascular: Negative.    Respiratory: Negative.     Musculoskeletal:  Positive for pain.     Objective:      Physical Exam   Constitutional: She is oriented to person, place, and time. She appears well-developed.   HENT:   Head: Normocephalic.   Eyes: Conjunctivae and EOM are normal. Pupils are equal, round, and reactive to light.   Neck: Neck supple.   Cardiovascular: Normal rate, regular rhythm and normal heart sounds.   Pulmonary/Chest: Effort normal and breath sounds normal.   Musculoskeletal: Normal range of motion.         General: Normal range of motion.        Arms:    Neurological: She is alert and oriented to person, place, and time.   Skin: Skin is warm and dry.   Psychiatric: Her behavior is normal.   Vitals reviewed.      Assessment:       1. Rib pain on right side    2. Fall, initial encounter                   EXAMINATION:  XR RIBS MIN 4 VIEWS W/ PA CHEST BILAT     CLINICAL HISTORY:  Pleurodynia     TECHNIQUE:  Two views of the right ribs and two views of the left ribs were performed.  Frontal view of the chest.     COMPARISON:  None     FINDINGS:  Normal cardiac silhouette.  The lungs are clear.  No pleural effusion.  No pneumothorax.     No appreciable rib fracture.     Impression:     No appreciable rib fracture.        Electronically signed by: Martha Ma   Plan:           Toradol 60 " mg IM in office.   Medication as ordered, do not combine NSAIDs.  Hot or cold therapy.  Active range of motion exercises. F/u with pcp. ER precautions.     Rib pain on right side  -     XR RIB BILATERAL W/ PA CHEST  -     ketorolac injection 60 mg  -     diclofenac (VOLTAREN) 75 MG EC tablet; Take 1 tablet (75 mg total) by mouth 2 (two) times daily.  Dispense: 60 tablet; Refill: 0  -     methocarbamoL (ROBAXIN) 750 MG Tab; Take 1 tablet (750 mg total) by mouth 3 (three) times daily as needed (pain).  Dispense: 15 tablet; Refill: 0    Fall, initial encounter  -     XR RIB BILATERAL W/ PA CHEST

## 2022-10-11 NOTE — LETTER
October 11, 2022      [facility]  [address] [address]  Phone: [phone]       Patient: Karen Tapia   YOB: 1975  Date of Visit: 10/11/2022    To Whom It May Concern:    Loy aTpia  was at [facility] on 10/11/2022. The patient may return to work/school on 10/12/22. If you have any questions or concerns, or if I can be of further assistance, please do not hesitate to contact me.    Sincerely,    LOUANN Carbajal, NP

## 2022-12-21 ENCOUNTER — OFFICE VISIT (OUTPATIENT)
Dept: PRIMARY CARE CLINIC | Facility: CLINIC | Age: 47
End: 2022-12-21
Payer: COMMERCIAL

## 2022-12-21 ENCOUNTER — CLINICAL SUPPORT (OUTPATIENT)
Dept: URGENT CARE | Facility: CLINIC | Age: 47
End: 2022-12-21
Payer: COMMERCIAL

## 2022-12-21 VITALS
HEIGHT: 66 IN | TEMPERATURE: 98 F | SYSTOLIC BLOOD PRESSURE: 121 MMHG | RESPIRATION RATE: 20 BRPM | BODY MASS INDEX: 39.86 KG/M2 | DIASTOLIC BLOOD PRESSURE: 82 MMHG | HEART RATE: 72 BPM | WEIGHT: 248 LBS | OXYGEN SATURATION: 99 %

## 2022-12-21 DIAGNOSIS — E11.9 TYPE 2 DIABETES MELLITUS WITHOUT COMPLICATION, WITHOUT LONG-TERM CURRENT USE OF INSULIN: ICD-10-CM

## 2022-12-21 DIAGNOSIS — Z00.00 ENCOUNTER FOR MEDICAL EXAMINATION TO ESTABLISH CARE: ICD-10-CM

## 2022-12-21 DIAGNOSIS — Z00.00 ENCOUNTER FOR MEDICAL EXAMINATION TO ESTABLISH CARE: Primary | ICD-10-CM

## 2022-12-21 DIAGNOSIS — I10 PRIMARY HYPERTENSION: Chronic | ICD-10-CM

## 2022-12-21 DIAGNOSIS — I10 PRIMARY HYPERTENSION: ICD-10-CM

## 2022-12-21 DIAGNOSIS — M54.50 ACUTE MIDLINE LOW BACK PAIN WITHOUT SCIATICA: ICD-10-CM

## 2022-12-21 DIAGNOSIS — E55.9 VITAMIN D DEFICIENCY: Chronic | ICD-10-CM

## 2022-12-21 DIAGNOSIS — E11.9 TYPE 2 DIABETES MELLITUS WITHOUT COMPLICATION, WITHOUT LONG-TERM CURRENT USE OF INSULIN: Chronic | ICD-10-CM

## 2022-12-21 DIAGNOSIS — Z12.31 BREAST CANCER SCREENING BY MAMMOGRAM: ICD-10-CM

## 2022-12-21 PROBLEM — M72.2 PLANTAR FASCIITIS OF RIGHT FOOT: Status: ACTIVE | Noted: 2022-12-21

## 2022-12-21 PROBLEM — E66.9 OBESITY: Status: ACTIVE | Noted: 2022-12-21

## 2022-12-21 PROBLEM — E66.9 OBESITY: Chronic | Status: ACTIVE | Noted: 2022-12-21

## 2022-12-21 PROBLEM — M19.90 OSTEOARTHRITIS: Chronic | Status: ACTIVE | Noted: 2022-12-21

## 2022-12-21 PROBLEM — S62.113A FRACTURE OF TRIQUETRUM: Status: ACTIVE | Noted: 2020-06-17

## 2022-12-21 PROBLEM — M19.90 OSTEOARTHRITIS: Status: ACTIVE | Noted: 2022-12-21

## 2022-12-21 LAB
ALBUMIN SERPL-MCNC: 4.3 G/DL (ref 3.5–5)
ALBUMIN/GLOB SERPL: 1.4 RATIO (ref 1.1–2)
ALP SERPL-CCNC: 66 UNIT/L (ref 40–150)
ALT SERPL-CCNC: 19 UNIT/L (ref 0–55)
APPEARANCE UR: CLEAR
AST SERPL-CCNC: 19 UNIT/L (ref 5–34)
BACTERIA #/AREA URNS AUTO: ABNORMAL /HPF
BASOPHILS # BLD AUTO: 0.04 X10(3)/MCL (ref 0–0.2)
BASOPHILS NFR BLD AUTO: 0.8 %
BILIRUB UR QL STRIP.AUTO: NEGATIVE MG/DL
BILIRUBIN DIRECT+TOT PNL SERPL-MCNC: 0.4 MG/DL
BUN SERPL-MCNC: 7.2 MG/DL (ref 7–18.7)
CALCIUM SERPL-MCNC: 9.7 MG/DL (ref 8.4–10.2)
CHLORIDE SERPL-SCNC: 106 MMOL/L (ref 98–107)
CHOLEST SERPL-MCNC: 175 MG/DL
CHOLEST/HDLC SERPL: 4 {RATIO} (ref 0–5)
CO2 SERPL-SCNC: 29 MMOL/L (ref 22–29)
COLOR UR AUTO: YELLOW
CREAT SERPL-MCNC: 0.93 MG/DL (ref 0.55–1.02)
DEPRECATED CALCIDIOL+CALCIFEROL SERPL-MC: 75 NG/ML (ref 30–80)
EOSINOPHIL # BLD AUTO: 0.11 X10(3)/MCL (ref 0–0.9)
EOSINOPHIL NFR BLD AUTO: 2.1 %
ERYTHROCYTE [DISTWIDTH] IN BLOOD BY AUTOMATED COUNT: 13.4 % (ref 11.5–17)
EST. AVERAGE GLUCOSE BLD GHB EST-MCNC: 134.1 MG/DL
GFR SERPLBLD CREATININE-BSD FMLA CKD-EPI: >60 MLS/MIN/1.73/M2
GLOBULIN SER-MCNC: 3.1 GM/DL (ref 2.4–3.5)
GLUCOSE SERPL-MCNC: 123 MG/DL (ref 74–100)
GLUCOSE UR QL STRIP.AUTO: NEGATIVE MG/DL
HBA1C MFR BLD: 6.3 %
HCT VFR BLD AUTO: 37.1 % (ref 37–47)
HDLC SERPL-MCNC: 48 MG/DL (ref 35–60)
HGB BLD-MCNC: 12 GM/DL (ref 12–16)
IMM GRANULOCYTES # BLD AUTO: 0.01 X10(3)/MCL (ref 0–0.04)
IMM GRANULOCYTES NFR BLD AUTO: 0.2 %
KETONES UR QL STRIP.AUTO: NEGATIVE MG/DL
LDLC SERPL CALC-MCNC: 107 MG/DL (ref 50–140)
LEUKOCYTE ESTERASE UR QL STRIP.AUTO: ABNORMAL UNIT/L
LYMPHOCYTES # BLD AUTO: 2.32 X10(3)/MCL (ref 0.6–4.6)
LYMPHOCYTES NFR BLD AUTO: 44.2 %
MCH RBC QN AUTO: 28.1 PG
MCHC RBC AUTO-ENTMCNC: 32.3 MG/DL (ref 33–36)
MCV RBC AUTO: 86.9 FL (ref 80–94)
MONOCYTES # BLD AUTO: 0.39 X10(3)/MCL (ref 0.1–1.3)
MONOCYTES NFR BLD AUTO: 7.4 %
NEUTROPHILS # BLD AUTO: 2.38 X10(3)/MCL (ref 2.1–9.2)
NEUTROPHILS NFR BLD AUTO: 45.3 %
NITRITE UR QL STRIP.AUTO: NEGATIVE
NRBC BLD AUTO-RTO: 0 %
PH UR STRIP.AUTO: 6 [PH]
PLATELET # BLD AUTO: 237 X10(3)/MCL (ref 130–400)
PMV BLD AUTO: 10.6 FL (ref 7.4–10.4)
POTASSIUM SERPL-SCNC: 4.4 MMOL/L (ref 3.5–5.1)
PROT SERPL-MCNC: 7.4 GM/DL (ref 6.4–8.3)
PROT UR QL STRIP.AUTO: NEGATIVE MG/DL
RBC # BLD AUTO: 4.27 X10(6)/MCL (ref 4.2–5.4)
RBC #/AREA URNS AUTO: <5 /HPF
RBC UR QL AUTO: NEGATIVE UNIT/L
SODIUM SERPL-SCNC: 144 MMOL/L (ref 136–145)
SP GR UR STRIP.AUTO: 1.01 (ref 1–1.03)
SQUAMOUS #/AREA URNS AUTO: <5 /HPF
TRIGL SERPL-MCNC: 100 MG/DL (ref 37–140)
TSH SERPL-ACNC: 1.75 UIU/ML (ref 0.35–4.94)
UROBILINOGEN UR STRIP-ACNC: 0.2 MG/DL
VLDLC SERPL CALC-MCNC: 20 MG/DL
WBC # SPEC AUTO: 5.3 X10(3)/MCL (ref 4.5–11.5)
WBC #/AREA URNS AUTO: 25 /HPF

## 2022-12-21 PROCEDURE — 80061 LIPID PANEL: CPT | Performed by: FAMILY MEDICINE

## 2022-12-21 PROCEDURE — 82306 VITAMIN D 25 HYDROXY: CPT | Performed by: FAMILY MEDICINE

## 2022-12-21 PROCEDURE — 3079F PR MOST RECENT DIASTOLIC BLOOD PRESSURE 80-89 MM HG: ICD-10-PCS | Mod: CPTII,,, | Performed by: FAMILY MEDICINE

## 2022-12-21 PROCEDURE — 81001 URINALYSIS AUTO W/SCOPE: CPT | Performed by: FAMILY MEDICINE

## 2022-12-21 PROCEDURE — 36415 COLL VENOUS BLD VENIPUNCTURE: CPT

## 2022-12-21 PROCEDURE — 3079F DIAST BP 80-89 MM HG: CPT | Mod: CPTII,,, | Performed by: FAMILY MEDICINE

## 2022-12-21 PROCEDURE — 1160F RVW MEDS BY RX/DR IN RCRD: CPT | Mod: CPTII,,, | Performed by: FAMILY MEDICINE

## 2022-12-21 PROCEDURE — 1159F PR MEDICATION LIST DOCUMENTED IN MEDICAL RECORD: ICD-10-PCS | Mod: CPTII,,, | Performed by: FAMILY MEDICINE

## 2022-12-21 PROCEDURE — 1160F PR REVIEW ALL MEDS BY PRESCRIBER/CLIN PHARMACIST DOCUMENTED: ICD-10-PCS | Mod: CPTII,,, | Performed by: FAMILY MEDICINE

## 2022-12-21 PROCEDURE — 85025 COMPLETE CBC W/AUTO DIFF WBC: CPT | Performed by: FAMILY MEDICINE

## 2022-12-21 PROCEDURE — 3074F PR MOST RECENT SYSTOLIC BLOOD PRESSURE < 130 MM HG: ICD-10-PCS | Mod: CPTII,,, | Performed by: FAMILY MEDICINE

## 2022-12-21 PROCEDURE — 3008F PR BODY MASS INDEX (BMI) DOCUMENTED: ICD-10-PCS | Mod: CPTII,,, | Performed by: FAMILY MEDICINE

## 2022-12-21 PROCEDURE — 80053 COMPREHEN METABOLIC PANEL: CPT | Performed by: FAMILY MEDICINE

## 2022-12-21 PROCEDURE — 3074F SYST BP LT 130 MM HG: CPT | Mod: CPTII,,, | Performed by: FAMILY MEDICINE

## 2022-12-21 PROCEDURE — 83036 HEMOGLOBIN GLYCOSYLATED A1C: CPT | Performed by: FAMILY MEDICINE

## 2022-12-21 PROCEDURE — 84443 ASSAY THYROID STIM HORMONE: CPT | Performed by: FAMILY MEDICINE

## 2022-12-21 PROCEDURE — 99203 PR OFFICE/OUTPT VISIT, NEW, LEVL III, 30-44 MIN: ICD-10-PCS | Mod: ,,, | Performed by: FAMILY MEDICINE

## 2022-12-21 PROCEDURE — 99203 OFFICE O/P NEW LOW 30 MIN: CPT | Mod: ,,, | Performed by: FAMILY MEDICINE

## 2022-12-21 PROCEDURE — 87088 URINE BACTERIA CULTURE: CPT | Performed by: FAMILY MEDICINE

## 2022-12-21 PROCEDURE — 3008F BODY MASS INDEX DOCD: CPT | Mod: CPTII,,, | Performed by: FAMILY MEDICINE

## 2022-12-21 PROCEDURE — 1159F MED LIST DOCD IN RCRD: CPT | Mod: CPTII,,, | Performed by: FAMILY MEDICINE

## 2022-12-21 RX ORDER — NAPROXEN 500 MG/1
500 TABLET ORAL 2 TIMES DAILY
Qty: 14 TABLET | Refills: 0 | Status: SHIPPED | OUTPATIENT
Start: 2022-12-21 | End: 2022-12-28

## 2022-12-21 RX ORDER — ERGOCALCIFEROL 1.25 MG/1
50000 CAPSULE ORAL
COMMUNITY

## 2022-12-21 RX ORDER — CYCLOBENZAPRINE HCL 5 MG
5 TABLET ORAL 3 TIMES DAILY PRN
Qty: 21 TABLET | Refills: 0 | Status: SHIPPED | OUTPATIENT
Start: 2022-12-21 | End: 2022-12-28

## 2022-12-21 NOTE — PROGRESS NOTES
Karen Tapia  2022  [id number]    Subjective:      Patient ID: Karen Tapia is a 47 y.o. female.    Chief Complaint: Back Pain and Establish Care (Would like to see specialist)    Disclaimer:  This note is prepared using voice recognition software and as such is likely to have errors despite attempts at proofreading. Please contact me for questions.     47-year-old female with questionable history of diabetes mellitus type 2, hypertension, vitamin-D deficiency and osteoarthritis who presents to establish care.  The patient states that she is had lower back pain for multiple weeks.  She states she is difficulty with flexion and extension.  She states she is a history of spinal stenosis and degenerative disc disease.  She states that she has tried diclofenac as well as ibuprofen which has not improved symptoms.  He denies any loss of bowel or bladder function, current numbness, tingling or recent trauma.  The patient admits to history of hypertension and diabetes mellitus type 2 with states that she is not currently being treated.  She denies any headache, blurred vision, dizziness, chest pain or shortness of breath.  She denies any floaters, nausea or vomiting.  She states that she is currently taking a vitamin-D supplement due to significant history of low vitamin-D as well as frequent fractures.  She is currently employed and denies tobacco or recreational drug use.  She admits to drinking alcohol socially.  The patient states that her last eye exam was recently and she is currently wearing new glasses.      History:  Past Medical History:   Diagnosis Date    Fracture of triquetrum 2020    Obesity 2022    Osteoarthritis 2022    Plantar fasciitis of right foot 2022    Primary hypertension 2022    Type 2 diabetes mellitus 2022    Vitamin D deficiency 2022     Past Surgical History:   Procedure Laterality Date     SECTION       Family History   Problem Relation Age  "of Onset    Alzheimer's disease Mother     Diabetes Mother     Hypertension Mother      Social History     Socioeconomic History    Marital status: Single   Tobacco Use    Smoking status: Never    Smokeless tobacco: Never   Substance and Sexual Activity    Alcohol use: Not Currently    Drug use: Not Currently     Patient Active Problem List   Diagnosis    Obesity    Osteoarthritis    Plantar fasciitis of right foot    Primary hypertension    Type 2 diabetes mellitus    Vitamin D deficiency    Fracture of triquetrum     Review of patient's allergies indicates:   Allergen Reactions    Codeine        Medications:  Current Outpatient Medications on File Prior to Visit   Medication Sig Dispense Refill    ergocalciferol (VITAMIN D2) 50,000 unit Cap Take 50,000 Units by mouth every 7 days.       No current facility-administered medications on file prior to visit.       Medications have been reviewed and reconciled with patient at this visit.    Review of Systems   Constitutional:  Negative for chills, diaphoresis and fever.   Eyes:  Negative for blurred vision and double vision.   Respiratory:  Negative for cough and shortness of breath.    Cardiovascular:  Negative for chest pain and leg swelling.   Gastrointestinal:  Negative for abdominal pain, diarrhea, nausea and vomiting.   Musculoskeletal:  Positive for back pain and joint pain. Negative for neck pain.   Skin:  Negative for rash.   Neurological:  Negative for dizziness, tremors and headaches.   Psychiatric/Behavioral:  Positive for memory loss.      Objective:     Vitals:    12/21/22 0841   BP: 121/82   BP Location: Left arm   Patient Position: Sitting   BP Method: X-Large (Automatic)   Pulse: 72   Resp: 20   Temp: 98 °F (36.7 °C)   SpO2: 99%   Weight: 112.5 kg (248 lb)   Height: 5' 6" (1.676 m)     Physical Exam  Constitutional:       General: She is not in acute distress.     Appearance: Normal appearance. She is normal weight.   HENT:      Head: Normocephalic and " atraumatic.      Right Ear: Tympanic membrane, ear canal and external ear normal.      Left Ear: Tympanic membrane, ear canal and external ear normal.      Mouth/Throat:      Mouth: Mucous membranes are moist.      Pharynx: Oropharynx is clear. No oropharyngeal exudate or posterior oropharyngeal erythema.   Eyes:      Extraocular Movements: Extraocular movements intact.      Conjunctiva/sclera: Conjunctivae normal.      Pupils: Pupils are equal, round, and reactive to light.   Cardiovascular:      Rate and Rhythm: Normal rate and regular rhythm.      Pulses: Normal pulses.      Heart sounds: Normal heart sounds, S1 normal and S2 normal. No murmur heard.    No gallop.   Pulmonary:      Effort: Pulmonary effort is normal. No respiratory distress.      Breath sounds: Normal breath sounds. No wheezing or rhonchi.   Musculoskeletal:         General: Normal range of motion.      Cervical back: Normal range of motion and neck supple. No swelling, edema, erythema, signs of trauma, tenderness or bony tenderness. No pain with movement. Normal range of motion.      Thoracic back: No swelling, tenderness or bony tenderness. Normal range of motion.      Lumbar back: No swelling, tenderness or bony tenderness. Normal range of motion. Negative right straight leg raise test and negative left straight leg raise test.      Right hip: No tenderness or bony tenderness. Normal range of motion.      Left hip: No tenderness or bony tenderness. Normal range of motion.   Skin:     General: Skin is warm and dry.   Neurological:      General: No focal deficit present.      Mental Status: She is alert and oriented to person, place, and time. Mental status is at baseline.      Motor: Motor function is intact. No weakness.   Psychiatric:         Mood and Affect: Mood normal.         Thought Content: Thought content normal.         Judgment: Judgment normal.     Assessment:     1. Encounter for medical examination to establish care    2. Primary  hypertension    3. Type 2 diabetes mellitus without complication, without long-term current use of insulin    4. Vitamin D deficiency    5. Breast cancer screening by mammogram    6. Acute midline low back pain without sciatica      Plan:   Karen was seen today for back pain and establish care.    Diagnoses and all orders for this visit:    Encounter for medical examination to establish care  -     CBC Auto Differential; Future  -     Comprehensive Metabolic Panel; Future  -     Hemoglobin A1C; Future  -     Lipid Panel; Future  -     TSH; Future  -     Vitamin D; Future  Recommend annual eye exam and biannual dental exams.  Limit caffeine and alcohol intake.  Well balanced diet low in sugar and increased vegetables encouraged.  Moderate intensity exercise 30 min/day at least 5 days/Wk (total 150 min/Wk) recommended.    Primary hypertension  -     Urinalysis, Reflex to Urine Culture Urine, Clean Catch; Future  -     SCHEDULED EKG 12-LEAD (to Muse); Future  -     CBC Auto Differential; Future  -     Comprehensive Metabolic Panel; Future  -     Lipid Panel; Future  -     TSH; Future  BP at goal and patient not currently taking any medications.  Low sodium diet and exercise recommended  Labs ordered above.  Monitor BP at home, keep BP log and notify MD if sBP >160 or <90. Also notify MD if dBP >100 or <60.  Limit caffeine intake.  Seek immediate medical treatment for chest pain, SOB, LE edema, severe headache, blurred vision, dizziness, slurred speech, any new or worsening symptoms.    Type 2 diabetes mellitus without complication, without long-term current use of insulin  -     SCHEDULED EKG 12-LEAD (to Muse); Future  -     Hemoglobin A1C; Future  -     Lipid Panel; Future  Patient's previously documented A1c <6.5 in 2021 and is currently taking no medications.  There is a questionable hx of DM2 however no documentation of A1c >6.5 or fasting blood glucose >126.  Repeat A1c ordered.  Will treat appropriately based on  results.  Notify MD if blood sugar <80 or >200.  ADA diet and exercise recommended.  Annual eye exam and home daily foot exams recommended.  Weight loss (at least 10%) recommended.  Seek immediate medical treatment for blood sugar <70 or >300 and for symptoms including diaphoresis, chest pain, SOB, tremors, syncope or any other worsening symptoms.    Vitamin D deficiency  -     Vitamin D; Future  Vitamin D level ordered and pending.   Patient will be treated appropriately based on results of testing.  Increase Vitamin D rich food in diet.  Despite vitamin D deficiency patient should continue to wear sunscreen prior to any prolonged sun exposure.    Breast cancer screening by mammogram  -     Mammo Digital Screening Bilat; Future    Acute midline low back pain without sciatica  -     X-Ray Lumbar Spine 2 Or 3 Views; Future  -     cyclobenzaprine (FLEXERIL) 5 MG tablet; Take 1 tablet (5 mg total) by mouth 3 (three) times daily as needed for Muscle spasms.  -     naproxen (NAPROSYN) 500 MG tablet; Take 1 tablet (500 mg total) by mouth 2 (two) times daily. for 7 days  ROM exercises recommended, warm compresses, consider PT if pain does not improve.  XR lumbar spine ordered. Consider referral to neurology due to back pain and hx of spinal stenosis.  Flexeril 5mg TID PRN, (do not take medication while driving or operating heavy machinery)  Continue NSAIDs PRN, may alternate with Tylenol  Contact clinic for any questions or concerns and notify MD if symptoms worsen or not improving.      Follow up: Follow up in about 8 weeks (around 2/15/2023) for Memory and HTN follow up.  After visit summary was printed and given to patient upon discharge today.  Karen Regina was given education on their disease process and medications.

## 2022-12-22 DIAGNOSIS — M54.50 ACUTE MIDLINE LOW BACK PAIN WITHOUT SCIATICA: Primary | ICD-10-CM

## 2022-12-23 LAB — BACTERIA UR CULT: NORMAL

## 2022-12-30 NOTE — PROGRESS NOTES
Subjective:       Patient ID: Karen Tapia is a 47 y.o. female.    Vitals:  vitals were not taken for this visit.     Chief Complaint: No chief complaint on file.    HPI  ROS    Objective:      Physical Exam      Assessment:       1. Primary hypertension    2. Type 2 diabetes mellitus without complication, without long-term current use of insulin    3. Encounter for medical examination to establish care    4. Vitamin D deficiency            Plan:         Primary hypertension  -     Urinalysis, Reflex to Urine Culture Urine, Clean Catch  -     Cancel: TSH  -     Cancel: CBC Auto Differential  -     Cancel: Comprehensive Metabolic Panel  -     TSH  -     Lipid Panel  -     Comprehensive Metabolic Panel  -     CBC Auto Differential  -     SCHEDULED EKG 12-LEAD (to Muse)  -     Urinalysis, Microscopic  -     Urine culture    Type 2 diabetes mellitus without complication, without long-term current use of insulin  -     Urinalysis, Reflex to Urine Culture Urine, Clean Catch  -     Cancel: Hemoglobin A1C  -     Cancel: Lipid Panel  -     Cancel: Comprehensive Metabolic Panel  -     Lipid Panel  -     Hemoglobin A1C  -     SCHEDULED EKG 12-LEAD (to Muse)  -     Urinalysis, Microscopic  -     Urine culture    Encounter for medical examination to establish care  -     Cancel: TSH  -     Cancel: CBC Auto Differential  -     Cancel: Vitamin D  -     Cancel: Hemoglobin A1C  -     Cancel: Lipid Panel  -     Cancel: Comprehensive Metabolic Panel  -     Vitamin D  -     TSH  -     Lipid Panel  -     Hemoglobin A1C  -     Comprehensive Metabolic Panel  -     CBC Auto Differential    Vitamin D deficiency  -     Cancel: Vitamin D  -     Vitamin D

## 2023-02-15 ENCOUNTER — OFFICE VISIT (OUTPATIENT)
Dept: PRIMARY CARE CLINIC | Facility: CLINIC | Age: 48
End: 2023-02-15
Payer: COMMERCIAL

## 2023-02-15 VITALS
OXYGEN SATURATION: 100 % | BODY MASS INDEX: 37.28 KG/M2 | WEIGHT: 232 LBS | TEMPERATURE: 98 F | HEART RATE: 68 BPM | SYSTOLIC BLOOD PRESSURE: 120 MMHG | HEIGHT: 66 IN | RESPIRATION RATE: 16 BRPM | DIASTOLIC BLOOD PRESSURE: 77 MMHG

## 2023-02-15 DIAGNOSIS — I10 PRIMARY HYPERTENSION: Primary | Chronic | ICD-10-CM

## 2023-02-15 DIAGNOSIS — R39.9 UTI SYMPTOMS: ICD-10-CM

## 2023-02-15 DIAGNOSIS — R41.3 MEMORY DEFICITS: ICD-10-CM

## 2023-02-15 DIAGNOSIS — L30.9 ACUTE DERMATITIS: ICD-10-CM

## 2023-02-15 DIAGNOSIS — H54.7 VISION DECREASED: ICD-10-CM

## 2023-02-15 DIAGNOSIS — R41.3 OTHER AMNESIA: ICD-10-CM

## 2023-02-15 LAB
BILIRUB SERPL-MCNC: NORMAL MG/DL
BLOOD URINE, POC: NORMAL
CLARITY, POC UA: NORMAL
COLOR, POC UA: NORMAL
GLUCOSE UR QL STRIP: NORMAL
KETONES UR QL STRIP: NORMAL
LEUKOCYTE ESTERASE URINE, POC: 75
NITRITE, POC UA: POSITIVE
PH, POC UA: 7
PROTEIN, POC: NORMAL
SPECIFIC GRAVITY, POC UA: 1.01
UROBILINOGEN, POC UA: NORMAL

## 2023-02-15 PROCEDURE — 81002 URINALYSIS NONAUTO W/O SCOPE: CPT | Mod: ,,, | Performed by: FAMILY MEDICINE

## 2023-02-15 PROCEDURE — 1160F RVW MEDS BY RX/DR IN RCRD: CPT | Mod: CPTII,,, | Performed by: FAMILY MEDICINE

## 2023-02-15 PROCEDURE — 99173 VISUAL ACUITY SCREENING: ICD-10-PCS | Mod: ,,, | Performed by: FAMILY MEDICINE

## 2023-02-15 PROCEDURE — 3074F SYST BP LT 130 MM HG: CPT | Mod: CPTII,,, | Performed by: FAMILY MEDICINE

## 2023-02-15 PROCEDURE — 99173 VISUAL ACUITY SCREEN: CPT | Mod: ,,, | Performed by: FAMILY MEDICINE

## 2023-02-15 PROCEDURE — 99214 PR OFFICE/OUTPT VISIT, EST, LEVL IV, 30-39 MIN: ICD-10-PCS | Mod: 25,,, | Performed by: FAMILY MEDICINE

## 2023-02-15 PROCEDURE — 99214 OFFICE O/P EST MOD 30 MIN: CPT | Mod: 25,,, | Performed by: FAMILY MEDICINE

## 2023-02-15 PROCEDURE — 1159F MED LIST DOCD IN RCRD: CPT | Mod: CPTII,,, | Performed by: FAMILY MEDICINE

## 2023-02-15 PROCEDURE — 1160F PR REVIEW ALL MEDS BY PRESCRIBER/CLIN PHARMACIST DOCUMENTED: ICD-10-PCS | Mod: CPTII,,, | Performed by: FAMILY MEDICINE

## 2023-02-15 PROCEDURE — 1159F PR MEDICATION LIST DOCUMENTED IN MEDICAL RECORD: ICD-10-PCS | Mod: CPTII,,, | Performed by: FAMILY MEDICINE

## 2023-02-15 PROCEDURE — 3078F DIAST BP <80 MM HG: CPT | Mod: CPTII,,, | Performed by: FAMILY MEDICINE

## 2023-02-15 PROCEDURE — 81002 POCT URINE DIPSTICK WITHOUT MICROSCOPE: ICD-10-PCS | Mod: ,,, | Performed by: FAMILY MEDICINE

## 2023-02-15 PROCEDURE — 3008F PR BODY MASS INDEX (BMI) DOCUMENTED: ICD-10-PCS | Mod: CPTII,,, | Performed by: FAMILY MEDICINE

## 2023-02-15 PROCEDURE — 3008F BODY MASS INDEX DOCD: CPT | Mod: CPTII,,, | Performed by: FAMILY MEDICINE

## 2023-02-15 PROCEDURE — 3074F PR MOST RECENT SYSTOLIC BLOOD PRESSURE < 130 MM HG: ICD-10-PCS | Mod: CPTII,,, | Performed by: FAMILY MEDICINE

## 2023-02-15 PROCEDURE — 3078F PR MOST RECENT DIASTOLIC BLOOD PRESSURE < 80 MM HG: ICD-10-PCS | Mod: CPTII,,, | Performed by: FAMILY MEDICINE

## 2023-02-15 RX ORDER — NITROFURANTOIN 25; 75 MG/1; MG/1
100 CAPSULE ORAL 2 TIMES DAILY
Qty: 10 CAPSULE | Refills: 0 | Status: SHIPPED | OUTPATIENT
Start: 2023-02-15 | End: 2023-02-20

## 2023-02-15 RX ORDER — CLOTRIMAZOLE AND BETAMETHASONE DIPROPIONATE 10; .64 MG/G; MG/G
CREAM TOPICAL 2 TIMES DAILY
Qty: 15 G | Refills: 1 | Status: SHIPPED | OUTPATIENT
Start: 2023-02-15 | End: 2023-02-22

## 2023-02-15 NOTE — PROGRESS NOTES
Subjective:      Patient ID: Karen Tapia is a 48 y.o. female.    Chief Complaint: Memory Loss and Hypertension    Disclaimer:  This note is prepared using voice recognition software and as such is likely to have errors despite attempts at proofreading. Please contact me for questions.     48yoF with hx of HTN and DM2 who presents for follow up. She states she is having difficulty with short-term memory and has been unable to remember recent conversations, days of the week, or the date.  She admits to a family history of Alzheimer's disease and is concerned as her memory has been worsening over the past few months    Hypertension  This is a chronic problem. The current episode started more than 1 year ago. The problem is controlled. Pertinent negatives include no blurred vision, chest pain, headaches or shortness of breath. There are no associated agents to hypertension. Risk factors for coronary artery disease include sedentary lifestyle and obesity. Past treatments include lifestyle changes. Compliance problems include exercise.  There is no history of angina, kidney disease or retinopathy. There is no history of chronic renal disease.   Urinary Tract Infection   This is a new problem. The current episode started acute onset. The problem occurs every urination. The problem has been unchanged. The quality of the pain is described as burning. The pain is mild. There has been no fever. There is A history of pyelonephritis. Associated symptoms include frequency, hesitancy and urgency. Pertinent negatives include no chills, flank pain, hematuria, nausea, vomiting or rash. She has tried nothing for the symptoms. Her past medical history is significant for hypertension and recurrent UTIs.     Past Medical History:   Diagnosis Date    Fracture of triquetrum 6/17/2020    Obesity 12/21/2022    Osteoarthritis 12/21/2022    Plantar fasciitis of right foot 12/21/2022    Primary hypertension 12/21/2022    Type 2 diabetes  "mellitus 12/21/2022    Vitamin D deficiency 12/21/2022        Current Outpatient Medications on File Prior to Visit   Medication Sig Dispense Refill    ergocalciferol (ERGOCALCIFEROL) 50,000 unit Cap Take 50,000 Units by mouth every 7 days.       No current facility-administered medications on file prior to visit.        Review of patient's allergies indicates:   Allergen Reactions    Codeine        Review of Systems   Constitutional:  Negative for chills, diaphoresis and fever.   Eyes:  Negative for blurred vision and double vision.   Respiratory:  Negative for cough and shortness of breath.    Cardiovascular:  Negative for chest pain and leg swelling.   Gastrointestinal:  Negative for abdominal pain, diarrhea, nausea and vomiting.   Genitourinary:  Positive for dysuria, frequency, hesitancy and urgency. Negative for flank pain and hematuria.   Skin:  Negative for rash.   Neurological:  Negative for dizziness, tremors and headaches.   Psychiatric/Behavioral:  Positive for memory loss.      Objective:     Vitals:    02/15/23 0938   BP: 120/77   BP Location: Right arm   Patient Position: Sitting   BP Method: Large (Manual)   Pulse: 68   Resp: 16   Temp: 98.3 °F (36.8 °C)   TempSrc: Oral   SpO2: 100%   Weight: 105.2 kg (232 lb)   Height: 5' 6" (1.676 m)     Physical Exam  Vitals reviewed.   Constitutional:       General: She is not in acute distress.     Appearance: Normal appearance. She is not ill-appearing, toxic-appearing or diaphoretic.   HENT:      Head: Normocephalic and atraumatic.      Right Ear: External ear normal.      Left Ear: External ear normal.   Eyes:      General:         Right eye: No discharge.         Left eye: No discharge.      Extraocular Movements: Extraocular movements intact.      Conjunctiva/sclera: Conjunctivae normal.   Cardiovascular:      Rate and Rhythm: Normal rate and regular rhythm.      Heart sounds: Normal heart sounds. No murmur heard.    No friction rub. No gallop.   Pulmonary: "      Effort: Pulmonary effort is normal. No accessory muscle usage or respiratory distress.      Breath sounds: Normal breath sounds and air entry. No stridor. No wheezing, rhonchi or rales.   Abdominal:      General: Bowel sounds are normal. There is no distension.      Palpations: Abdomen is soft. There is no mass.      Tenderness: There is no abdominal tenderness. There is no right CVA tenderness, left CVA tenderness, guarding or rebound.   Musculoskeletal:         General: Normal range of motion.      Cervical back: Normal range of motion.   Skin:     General: Skin is warm and dry.   Neurological:      General: No focal deficit present.      Mental Status: She is alert and oriented to person, place, and time. Mental status is at baseline.   Psychiatric:         Mood and Affect: Mood normal.         Behavior: Behavior normal. Behavior is cooperative.         Thought Content: Thought content normal.         Cognition and Memory: She exhibits impaired recent memory.         Judgment: Judgment normal.       Assessment:     1. Primary hypertension    2. Memory deficits    3. UTI symptoms    4. Acute dermatitis    5. Vision decreased      Plan:     1. Primary hypertension  BP at goal and patient not currently taking any medications.  Low sodium diet and exercise recommended  Labs ordered above.  Monitor BP at home, keep BP log and notify MD if sBP >160 or <90. Also notify MD if dBP >100 or <60.  Limit caffeine intake.  Seek immediate medical treatment for chest pain, SOB, LE edema, severe headache, blurred vision, dizziness, slurred speech, any new or worsening symptoms.    2. Memory deficits  GPCOG 6/9  MMSE 29/30  CT Head W/O Contrast ordered and pending.  Patient states memory deficits are interfering with daily life.  Family hx of Alzheimer's dementia.  Referral to neurology for further evaluation.    3. UTI symptoms  - POCT URINE DIPSTICK WITHOUT MICROSCOPE  - nitrofurantoin, macrocrystal-monohydrate, (MACROBID)  100 MG capsule; Take 1 capsule (100 mg total) by mouth 2 (two) times daily. for 5 days  Dispense: 10 capsule; Refill: 0  Start antibiotic today  Watch closely for worsening symptoms  Contact this clinic for any further problems  Urine culture results will be monitored and reported, treatment changes will be made if necessary    4. Acute dermatitis  - clotrimazole-betamethasone 1-0.05% (LOTRISONE) cream; Apply topically 2 (two) times daily. for 7 days  Dispense: 15 g; Refill: 1  Start Lotrisone as prescribed above.  Avoid bathing hot water, lukewarm/tepid water preferred to decrease dry skin which may increase itching.  Avoid fragrant soaps, lotions, and detergents.  Keep skin moisturized.  Monitor for change or worsening of symptoms.  Contact clinic for questions or concerns.      Follow up in about 4 months (around 6/15/2023) for DM2, memory Follow Up.  Karen Tapia was given education on their disease process and medications.

## 2023-02-17 DIAGNOSIS — N39.0 ACUTE UTI: Primary | ICD-10-CM

## 2023-02-17 RX ORDER — DOXYCYCLINE HYCLATE 100 MG
100 TABLET ORAL EVERY 12 HOURS
Qty: 14 TABLET | Refills: 0 | Status: SHIPPED | OUTPATIENT
Start: 2023-02-17 | End: 2023-02-24

## 2023-02-27 ENCOUNTER — DOCUMENTATION ONLY (OUTPATIENT)
Dept: ADMINISTRATIVE | Facility: HOSPITAL | Age: 48
End: 2023-02-27
Payer: COMMERCIAL

## 2023-02-28 ENCOUNTER — DOCUMENTATION ONLY (OUTPATIENT)
Dept: ADMINISTRATIVE | Facility: HOSPITAL | Age: 48
End: 2023-02-28
Payer: COMMERCIAL

## 2023-03-20 ENCOUNTER — PATIENT OUTREACH (OUTPATIENT)
Dept: ADMINISTRATIVE | Facility: HOSPITAL | Age: 48
End: 2023-03-20
Payer: COMMERCIAL

## 2023-03-20 NOTE — PROGRESS NOTES
Population Health. [facility]. Reviewing patient's chart for quality metrics. I attempted to contact patient to see if she has had a recent mmg. No answer. Left message.

## 2023-04-03 ENCOUNTER — PATIENT MESSAGE (OUTPATIENT)
Dept: ADMINISTRATIVE | Facility: HOSPITAL | Age: 48
End: 2023-04-03
Payer: COMMERCIAL

## 2023-04-24 ENCOUNTER — OFFICE VISIT (OUTPATIENT)
Dept: URGENT CARE | Facility: CLINIC | Age: 48
End: 2023-04-24
Payer: COMMERCIAL

## 2023-04-24 VITALS
TEMPERATURE: 99 F | BODY MASS INDEX: 36.96 KG/M2 | DIASTOLIC BLOOD PRESSURE: 85 MMHG | HEIGHT: 66 IN | HEART RATE: 59 BPM | RESPIRATION RATE: 18 BRPM | SYSTOLIC BLOOD PRESSURE: 135 MMHG | OXYGEN SATURATION: 99 % | WEIGHT: 230 LBS

## 2023-04-24 DIAGNOSIS — J02.9 SORE THROAT: ICD-10-CM

## 2023-04-24 DIAGNOSIS — N30.01 ACUTE CYSTITIS WITH HEMATURIA: Primary | ICD-10-CM

## 2023-04-24 PROBLEM — M25.562 CHRONIC PAIN OF LEFT KNEE: Status: ACTIVE | Noted: 2023-04-24

## 2023-04-24 PROBLEM — G89.29 CHRONIC PAIN OF LEFT KNEE: Status: ACTIVE | Noted: 2023-04-24

## 2023-04-24 LAB
BILIRUB UR QL STRIP: POSITIVE
CTP QC/QA: YES
GLUCOSE UR QL STRIP: POSITIVE
KETONES UR QL STRIP: POSITIVE
LEUKOCYTE ESTERASE UR QL STRIP: POSITIVE
MOLECULAR STREP A: NEGATIVE
PH, POC UA: 5
POC BLOOD, URINE: POSITIVE
POC NITRATES, URINE: POSITIVE
PROT UR QL STRIP: POSITIVE
SP GR UR STRIP: 1.02 (ref 1–1.03)
UROBILINOGEN UR STRIP-ACNC: 4 (ref 0.1–1.1)

## 2023-04-24 PROCEDURE — 99214 OFFICE O/P EST MOD 30 MIN: CPT | Mod: S$PBB,,, | Performed by: NURSE PRACTITIONER

## 2023-04-24 PROCEDURE — 63600175 PHARM REV CODE 636 W HCPCS: Performed by: NURSE PRACTITIONER

## 2023-04-24 PROCEDURE — 99214 OFFICE O/P EST MOD 30 MIN: CPT | Mod: PBBFAC | Performed by: NURSE PRACTITIONER

## 2023-04-24 PROCEDURE — 99214 PR OFFICE/OUTPT VISIT, EST, LEVL IV, 30-39 MIN: ICD-10-PCS | Mod: S$PBB,,, | Performed by: NURSE PRACTITIONER

## 2023-04-24 PROCEDURE — 87651 STREP A DNA AMP PROBE: CPT | Mod: PBBFAC | Performed by: NURSE PRACTITIONER

## 2023-04-24 PROCEDURE — 81003 URINALYSIS AUTO W/O SCOPE: CPT | Mod: PBBFAC | Performed by: NURSE PRACTITIONER

## 2023-04-24 RX ORDER — LIDOCAINE HYDROCHLORIDE 10 MG/ML
2 INJECTION INFILTRATION; PERINEURAL ONCE
Status: COMPLETED | OUTPATIENT
Start: 2023-04-24 | End: 2023-04-24

## 2023-04-24 RX ORDER — CEFTRIAXONE 1 G/1
1 INJECTION, POWDER, FOR SOLUTION INTRAMUSCULAR; INTRAVENOUS
Status: COMPLETED | OUTPATIENT
Start: 2023-04-24 | End: 2023-04-24

## 2023-04-24 RX ORDER — CIPROFLOXACIN 500 MG/1
500 TABLET ORAL EVERY 12 HOURS
Qty: 14 TABLET | Refills: 0 | Status: SHIPPED | OUTPATIENT
Start: 2023-04-24 | End: 2023-05-01

## 2023-04-24 RX ORDER — PHENAZOPYRIDINE HYDROCHLORIDE 200 MG/1
200 TABLET, FILM COATED ORAL
Qty: 9 TABLET | Refills: 0 | Status: SHIPPED | OUTPATIENT
Start: 2023-04-24 | End: 2023-04-27

## 2023-04-24 RX ADMIN — LIDOCAINE HYDROCHLORIDE 2 ML: 10 INJECTION INFILTRATION; PERINEURAL at 11:04

## 2023-04-24 RX ADMIN — CEFTRIAXONE 1 G: 1 INJECTION, POWDER, FOR SOLUTION INTRAMUSCULAR; INTRAVENOUS at 10:04

## 2023-04-24 NOTE — PROGRESS NOTES
"Subjective:      Patient ID: Karen Tapia is a 48 y.o. female.    Vitals:  height is 5' 6" (1.676 m) and weight is 104.3 kg (230 lb). Her oral temperature is 98.5 °F (36.9 °C). Her blood pressure is 135/85 and her pulse is 59 (abnormal). Her respiration is 18 and oxygen saturation is 99%.     Chief Complaint: Dysuria (Freq and painful urination x 3 days) and scratchy throat (Exposed to strep)    HPI frequent UTIs; took Macrobid then Doxy in February. Saw Urology "a long time ago", did a straight sterile cath, had no growth and was not seen again. This episode started 2 days ago, chills, back pain, burning on urination. No measured fevers, vomiting or diarrhea.   ROS   Objective:     Physical Exam   Constitutional: She is oriented to person, place, and time. She does not appear ill. normal  HENT:   Nose: No rhinorrhea or congestion.   Eyes: Conjunctivae are normal.   Pulmonary/Chest: Effort normal.   Abdominal: Normal appearance. There is no left CVA tenderness and no right CVA tenderness.   Musculoskeletal: Normal range of motion.         General: Normal range of motion.   Neurological: She is alert and oriented to person, place, and time.   Skin: Skin is warm and dry.   Psychiatric: Her behavior is normal. Mood, judgment and thought content normal.   Nursing note and vitals reviewed.chaperone present     Assessment:     1. Acute cystitis with hematuria    2. Sore throat      Results for orders placed or performed in visit on 04/24/23   POCT Urinalysis, Dipstick, Automated, W/O Scope   Result Value Ref Range    POC Blood, Urine Positive (A) Negative    POC Bilirubin, Urine Positive (A) Negative    POC Urobilinogen, Urine 4.0 (A) 0.1 - 1.1    POC Ketones, Urine Positive (A) Negative    POC Protein, Urine Positive (A) Negative    POC Nitrates, Urine Positive (A) Negative    POC Glucose, Urine Positive (A) Negative    pH, UA 5.0     POC Specific Gravity, Urine 1.020 1.003 - 1.029    POC Leukocytes, Urine Positive (A) " Negative   POCT Strep A, Molecular   Result Value Ref Range    Molecular Strep A, POC Negative Negative     Acceptable Yes        Plan:       Acute cystitis with hematuria  -     POCT Urinalysis, Dipstick, Automated, W/O Scope    Sore throat  -     POCT Strep A, Molecular    Other orders  -     cefTRIAXone injection 1 g  -     LIDOcaine HCL 10 mg/ml (1%) injection 2 mL  -     ciprofloxacin HCl (CIPRO) 500 MG tablet; Take 1 tablet (500 mg total) by mouth every 12 (twelve) hours. for 7 days  Dispense: 14 tablet; Refill: 0  -     phenazopyridine (PYRIDIUM) 200 MG tablet; Take 1 tablet (200 mg total) by mouth 3 (three) times daily with meals. for 3 days  Dispense: 9 tablet; Refill: 0        Please make an appointment with your PCP this week or next, to get a referral for Urology at [facility] or [facility] to evaluate frequent UTIs.    - increase water intake to no less than 3 bottles per day  - tylenol or motrin for pain and fever  - take antibiotics as prescribed - do not stop them early  - if you need an antibiotic change, we will notify you in approximately 3 days. If you do not receive a call from us, continue the medication you received today.

## 2023-04-24 NOTE — PATIENT INSTRUCTIONS
Please make an appointment with your PCP this week or next, to get a referral for Urology at [facility] or [place] [facility] [facility] to evaluate frequent UTIs.    - increase water intake to no less than 3 bottles per day  - tylenol or motrin for pain and fever  - take antibiotics as prescribed - do not stop them early  - if you need an antibiotic change, we will notify you in approximately 3 days. If you do not receive a call from us, continue the medication you received today.

## 2023-05-30 ENCOUNTER — PATIENT MESSAGE (OUTPATIENT)
Dept: ADMINISTRATIVE | Facility: HOSPITAL | Age: 48
End: 2023-05-30
Payer: COMMERCIAL

## 2023-06-15 ENCOUNTER — OFFICE VISIT (OUTPATIENT)
Dept: PRIMARY CARE CLINIC | Facility: CLINIC | Age: 48
End: 2023-06-15
Payer: COMMERCIAL

## 2023-06-15 VITALS
HEART RATE: 67 BPM | BODY MASS INDEX: 36.86 KG/M2 | RESPIRATION RATE: 16 BRPM | DIASTOLIC BLOOD PRESSURE: 78 MMHG | OXYGEN SATURATION: 98 % | WEIGHT: 229.38 LBS | TEMPERATURE: 98 F | HEIGHT: 66 IN | SYSTOLIC BLOOD PRESSURE: 120 MMHG

## 2023-06-15 DIAGNOSIS — Z12.11 COLON CANCER SCREENING: ICD-10-CM

## 2023-06-15 DIAGNOSIS — Z12.31 BREAST CANCER SCREENING BY MAMMOGRAM: ICD-10-CM

## 2023-06-15 DIAGNOSIS — R41.3 MEMORY DEFICITS: Primary | ICD-10-CM

## 2023-06-15 DIAGNOSIS — R73.03 PREDIABETES: ICD-10-CM

## 2023-06-15 DIAGNOSIS — E66.01 SEVERE OBESITY (BMI 35.0-39.9) WITH COMORBIDITY: ICD-10-CM

## 2023-06-15 PROBLEM — E11.9 DIABETES MELLITUS: Status: RESOLVED | Noted: 2022-12-21 | Resolved: 2023-06-15

## 2023-06-15 PROCEDURE — 1160F RVW MEDS BY RX/DR IN RCRD: CPT | Mod: CPTII,,, | Performed by: FAMILY MEDICINE

## 2023-06-15 PROCEDURE — 3078F DIAST BP <80 MM HG: CPT | Mod: CPTII,,, | Performed by: FAMILY MEDICINE

## 2023-06-15 PROCEDURE — 1160F PR REVIEW ALL MEDS BY PRESCRIBER/CLIN PHARMACIST DOCUMENTED: ICD-10-PCS | Mod: CPTII,,, | Performed by: FAMILY MEDICINE

## 2023-06-15 PROCEDURE — 1159F PR MEDICATION LIST DOCUMENTED IN MEDICAL RECORD: ICD-10-PCS | Mod: CPTII,,, | Performed by: FAMILY MEDICINE

## 2023-06-15 PROCEDURE — 99214 PR OFFICE/OUTPT VISIT, EST, LEVL IV, 30-39 MIN: ICD-10-PCS | Mod: ,,, | Performed by: FAMILY MEDICINE

## 2023-06-15 PROCEDURE — 3078F PR MOST RECENT DIASTOLIC BLOOD PRESSURE < 80 MM HG: ICD-10-PCS | Mod: CPTII,,, | Performed by: FAMILY MEDICINE

## 2023-06-15 PROCEDURE — 3074F SYST BP LT 130 MM HG: CPT | Mod: CPTII,,, | Performed by: FAMILY MEDICINE

## 2023-06-15 PROCEDURE — 1159F MED LIST DOCD IN RCRD: CPT | Mod: CPTII,,, | Performed by: FAMILY MEDICINE

## 2023-06-15 PROCEDURE — 99214 OFFICE O/P EST MOD 30 MIN: CPT | Mod: ,,, | Performed by: FAMILY MEDICINE

## 2023-06-15 PROCEDURE — 3008F PR BODY MASS INDEX (BMI) DOCUMENTED: ICD-10-PCS | Mod: CPTII,,, | Performed by: FAMILY MEDICINE

## 2023-06-15 PROCEDURE — 3008F BODY MASS INDEX DOCD: CPT | Mod: CPTII,,, | Performed by: FAMILY MEDICINE

## 2023-06-15 PROCEDURE — 3074F PR MOST RECENT SYSTOLIC BLOOD PRESSURE < 130 MM HG: ICD-10-PCS | Mod: CPTII,,, | Performed by: FAMILY MEDICINE

## 2023-06-15 RX ORDER — METFORMIN HYDROCHLORIDE 500 MG/1
500 TABLET, EXTENDED RELEASE ORAL DAILY
Qty: 90 TABLET | Refills: 3 | Status: SHIPPED | OUTPATIENT
Start: 2023-06-15

## 2023-06-15 NOTE — PROGRESS NOTES
Subjective:      Patient ID: Karen Tapia is a 48 y.o. female.    Chief Complaint: Diabetes (F/u and memory check )    Disclaimer:  This note is prepared using voice recognition software and as such is likely to have errors despite attempts at proofreading. Please contact me for questions.     48-year-old female who presents for follow-up of prediabetes and memory deficits.  The patient initially had a documentation of type 2 diabetes mellitus without complication however multiple previous HbA1c appear to be less than 6.5.  The patient clarified that she had been previously counseled on prediabetes and had been prescribed Metformin in anticipation.  The patient states that she has been engaging in regular exercise and has had intentional weight loss.  She states that she has continued to have mostly unhealthy diet however and frequently drinks Coca-Cola.  She states that her last eye exam was within the last year. The patient admits to continued impaired memory but denies any worsening of deficits.  She states that she is often forgetful and has to write things down.  She was previously referred to Neurology and states that she has an appointment scheduled in November 2023.  Patient has family history of Alzheimer's dementia.  The patient states that she was supposed to be scheduled for and a mammogram however she canceled the appointment due to the imaging center being out of network.  She requests repeat order for mammogram sent to a different facility.  She admits to a family history of colon cancer and states her last colonoscopy results were significant for colon polyps.  She is unsure of the date of her last colonoscopy.    Past Medical History:   Diagnosis Date    Fracture of triquetrum 06/17/2020    Obesity 12/21/2022    Osteoarthritis 12/21/2022    Personal history of colonic polyps     Plantar fasciitis of right foot 12/21/2022    Prediabetes 12/21/2022    Primary hypertension 12/21/2022    Vitamin D  "deficiency 12/21/2022      Current Outpatient Medications on File Prior to Visit   Medication Sig Dispense Refill    ergocalciferol (ERGOCALCIFEROL) 50,000 unit Cap Take 50,000 Units by mouth every 7 days.       No current facility-administered medications on file prior to visit.      Review of patient's allergies indicates:   Allergen Reactions    Codeine Rash        Review of Systems   Constitutional:  Negative for chills, diaphoresis and fever.   Eyes:  Negative for blurred vision and double vision.   Respiratory:  Negative for cough and shortness of breath.    Cardiovascular:  Negative for chest pain and leg swelling.   Gastrointestinal:  Negative for abdominal pain, diarrhea, nausea and vomiting.   Skin:  Negative for rash.   Neurological:  Negative for dizziness, tremors and headaches.     Objective:     Vitals:    06/15/23 1511   BP: 120/78   BP Location: Right arm   Patient Position: Sitting   Pulse: 67   Resp: 16   Temp: 97.8 °F (36.6 °C)   TempSrc: Oral   SpO2: 98%   Weight: 104.1 kg (229 lb 6.4 oz)   Height: 5' 6" (1.676 m)     Physical Exam  Vitals reviewed.   Constitutional:       General: She is not in acute distress.     Appearance: Normal appearance. She is not ill-appearing, toxic-appearing or diaphoretic.   HENT:      Head: Normocephalic.   Eyes:      General:         Right eye: No discharge.         Left eye: No discharge.      Extraocular Movements: Extraocular movements intact.      Conjunctiva/sclera: Conjunctivae normal.   Cardiovascular:      Rate and Rhythm: Normal rate and regular rhythm.      Pulses: Normal pulses.      Heart sounds: Normal heart sounds. No murmur heard.    No friction rub. No gallop.   Pulmonary:      Effort: Pulmonary effort is normal. No respiratory distress.      Breath sounds: Normal breath sounds. No stridor. No wheezing, rhonchi or rales.   Musculoskeletal:         General: Normal range of motion.      Cervical back: Normal range of motion and neck supple. No " rigidity.   Skin:     General: Skin is warm and dry.      Coloration: Skin is not pale.   Neurological:      General: No focal deficit present.      Mental Status: She is alert and oriented to person, place, and time. Mental status is at baseline.   Psychiatric:         Mood and Affect: Mood normal.         Behavior: Behavior normal.         Thought Content: Thought content normal.         Judgment: Judgment normal.     Lab Results   Component Value Date/Time    HGBA1C 6.3 12/21/2022 09:55 AM    CHOL 175 12/21/2022 09:55 AM    .00 12/21/2022 09:55 AM    HDL 48 12/21/2022 09:55 AM    TRIG 100 12/21/2022 09:55 AM    TSH 1.747 12/21/2022 09:55 AM     12/21/2022 09:55 AM    K 4.4 12/21/2022 09:55 AM    CHLORIDE 106 12/21/2022 09:55 AM    CO2 29 12/21/2022 09:55 AM    GLUCOSE 123 (H) 12/21/2022 09:55 AM    BUN 7.2 12/21/2022 09:55 AM    CREATININE 0.93 12/21/2022 09:55 AM    AST 19 12/21/2022 09:55 AM    ALT 19 12/21/2022 09:55 AM    HGB 12.0 12/21/2022 09:55 AM    HCT 37.1 12/21/2022 09:55 AM    WBC 5.3 12/21/2022 09:55 AM     12/21/2022 09:55 AM      Assessment:     1. Memory deficits    2. Prediabetes    3. Severe obesity (BMI 35.0-39.9) with comorbidity    4. Breast cancer screening by mammogram    5. Colon cancer screening      Plan:     1. Memory deficits  Labs reviewed in clinic.  Patient has appointment with neurology scheduled in 11/2023. Keep follow up with neurology.  Education given about memory loss and improving memory.  Encouraged healthy well balanced diet and exercise.  CT Head W/O Contrast previously ordered but not completed. Consider discussion with neurology about imaging.  MOCA 28/30.     2. Prediabetes  - metFORMIN (GLUCOPHAGE-XR) 500 MG ER 24hr tablet; Take 1 tablet (500 mg total) by mouth once daily.  Dispense: 90 tablet; Refill: 3  - Hemoglobin A1C; Future  HbA1c 6.3 (12/2022).  Start new meds: Metformin  mg PO daily.   ADA diet and exercise recommended.  Annual eye  exam recommended.  Seek immediate medical treatment for blood sugar <70 or >300 and for symptoms including diaphoresis, chest pain, SOB, tremors, syncope or any other worsening symptoms.    3. Severe obesity (BMI 35.0-39.9) with comorbidity  See #2.    4. Breast cancer screening by mammogram  - Mammo Digital Screening Bilat; Future    5. Colon cancer screening   Records to be requested from Gastroenterology for last colonoscopy.    Patient admits to family history of colon cancer screening.    Patient counseled about different modalities for colon cancer screening.    Follow up in about 3 months (around 9/15/2023) for Wellness Visit.  Karen Tapia was given education on their disease process and medications.

## 2023-06-18 ENCOUNTER — OFFICE VISIT (OUTPATIENT)
Dept: URGENT CARE | Facility: CLINIC | Age: 48
End: 2023-06-18
Payer: COMMERCIAL

## 2023-06-18 VITALS
BODY MASS INDEX: 37.52 KG/M2 | OXYGEN SATURATION: 97 % | SYSTOLIC BLOOD PRESSURE: 114 MMHG | HEART RATE: 66 BPM | WEIGHT: 233.44 LBS | DIASTOLIC BLOOD PRESSURE: 76 MMHG | RESPIRATION RATE: 20 BRPM | TEMPERATURE: 98 F | HEIGHT: 66 IN

## 2023-06-18 DIAGNOSIS — R30.0 DYSURIA: Primary | ICD-10-CM

## 2023-06-18 DIAGNOSIS — N30.90 CYSTITIS: ICD-10-CM

## 2023-06-18 LAB
BILIRUB UR QL STRIP: NEGATIVE
GLUCOSE UR QL STRIP: NEGATIVE
KETONES UR QL STRIP: NEGATIVE
LEUKOCYTE ESTERASE UR QL STRIP: POSITIVE
PH, POC UA: 7
POC BLOOD, URINE: POSITIVE
POC NITRATES, URINE: NEGATIVE
PROT UR QL STRIP: NEGATIVE
SP GR UR STRIP: <=1.005 (ref 1–1.03)
UROBILINOGEN UR STRIP-ACNC: 0.2 (ref 0.1–1.1)

## 2023-06-18 PROCEDURE — 99214 OFFICE O/P EST MOD 30 MIN: CPT | Mod: PBBFAC | Performed by: FAMILY MEDICINE

## 2023-06-18 PROCEDURE — 81003 URINALYSIS AUTO W/O SCOPE: CPT | Mod: PBBFAC | Performed by: FAMILY MEDICINE

## 2023-06-18 PROCEDURE — 99213 PR OFFICE/OUTPT VISIT, EST, LEVL III, 20-29 MIN: ICD-10-PCS | Mod: S$PBB,,, | Performed by: FAMILY MEDICINE

## 2023-06-18 PROCEDURE — 99213 OFFICE O/P EST LOW 20 MIN: CPT | Mod: S$PBB,,, | Performed by: FAMILY MEDICINE

## 2023-06-18 PROCEDURE — 87088 URINE BACTERIA CULTURE: CPT | Performed by: FAMILY MEDICINE

## 2023-06-18 RX ORDER — PHENAZOPYRIDINE HYDROCHLORIDE 200 MG/1
200 TABLET, FILM COATED ORAL
Qty: 9 TABLET | Refills: 0 | Status: SHIPPED | OUTPATIENT
Start: 2023-06-18 | End: 2023-06-21

## 2023-06-18 RX ORDER — NITROFURANTOIN 25; 75 MG/1; MG/1
100 CAPSULE ORAL 2 TIMES DAILY
Qty: 14 CAPSULE | Refills: 0 | Status: SHIPPED | OUTPATIENT
Start: 2023-06-18 | End: 2023-06-25

## 2023-06-18 NOTE — LETTER
June 18, 2023      [facility]  [address] [address]  Phone: [phone]       Patient: Karen Tapia   YOB: 1975  Date of Visit: 06/18/2023    To Whom It May Concern:    Loy Tapia  was at [facility] on 06/18/2023. The patient may return to work/school on June 19 2023 with no restrictions. If you have any questions or concerns, or if I can be of further assistance, please do not hesitate to contact me.    Sincerely,    DONTE NORIEGA MD

## 2023-06-18 NOTE — PROGRESS NOTES
"Subjective:      Patient ID: Karen Tapia is a 48 y.o. female.    Vitals:  height is 5' 6.14" (1.68 m) and weight is 105.9 kg (233 lb 7.5 oz). Her temperature is 98.1 °F (36.7 °C). Her blood pressure is 114/76 and her pulse is 66. Her respiration is 20 and oxygen saturation is 97%.     Chief Complaint: Dysuria (X 4days) and Urinary Frequency    Dysuria   There has been no fever. Associated symptoms include frequency, urgency and withholding. Pertinent negatives include no discharge, flank pain, hematuria, nausea, possible pregnancy, vomiting or rash. Her past medical history is significant for recurrent UTIs.     Constitution: Negative for fever.   Cardiovascular:  Negative for chest pain.   Respiratory:  Negative for shortness of breath and wheezing.    Gastrointestinal:  Negative for abdominal pain, nausea, vomiting and diarrhea.   Genitourinary:  Positive for dysuria, frequency and urgency. Negative for urine decreased, flank pain, bladder incontinence, hematuria, vaginal discharge and pelvic pain.   Skin:  Negative for rash.   Neurological:  Negative for altered mental status.   Psychiatric/Behavioral:  Negative for altered mental status.     Objective:     Physical Exam   Constitutional:  Non-toxic appearance. She does not appear ill. No distress.   Neck: Neck supple.   Abdominal: Normal appearance. She exhibits no distension. flat abdomen There is no abdominal tenderness. There is no rebound, no guarding, no left CVA tenderness and no right CVA tenderness.   Neurological: no focal deficit. She is alert.   Skin: Skin is warm, dry and not diaphoretic.   Psychiatric: Her behavior is normal. Mood normal.   Nursing note and vitals reviewed.  Results for orders placed or performed in visit on 06/18/23   POCT Urinalysis, Dipstick, Automated, W/O Scope   Result Value Ref Range    POC Blood, Urine Positive (A) Negative    POC Bilirubin, Urine Negative Negative    POC Urobilinogen, Urine 0.2 0.1 - 1.1    POC Ketones, " Urine Negative Negative    POC Protein, Urine Negative Negative    POC Nitrates, Urine Negative Negative    POC Glucose, Urine Negative Negative    pH, UA 7.0     POC Specific Gravity, Urine <=1.005 1.003 - 1.029    POC Leukocytes, Urine Positive (A) Negative       Assessment:     1. Dysuria    2. Cystitis        Plan:   Take medication as prescribed.  Increase your fluids and get plenty of rest.  If a urine culture was done, we will notify you if it indicates the need to change antibiotics.  Please follow instructions on patient education material.  Return to urgent care in 2 to 3 days if symptoms are not improving, immediately if you develop new or worsening symptoms.    Dysuria  -     POCT Urinalysis, Dipstick, Automated, W/O Scope    Cystitis  -     nitrofurantoin, macrocrystal-monohydrate, (MACROBID) 100 MG capsule; Take 1 capsule (100 mg total) by mouth 2 (two) times daily. for 7 days  Dispense: 14 capsule; Refill: 0  -     phenazopyridine (PYRIDIUM) 200 MG tablet; Take 1 tablet (200 mg total) by mouth every meal as needed for Pain.  Dispense: 9 tablet; Refill: 0  -     Urine culture

## 2023-06-20 LAB — BACTERIA UR CULT: NO GROWTH

## 2023-07-04 ENCOUNTER — OFFICE VISIT (OUTPATIENT)
Dept: URGENT CARE | Facility: CLINIC | Age: 48
End: 2023-07-04
Payer: COMMERCIAL

## 2023-07-04 VITALS
HEIGHT: 67 IN | DIASTOLIC BLOOD PRESSURE: 88 MMHG | TEMPERATURE: 98 F | HEART RATE: 71 BPM | SYSTOLIC BLOOD PRESSURE: 123 MMHG | WEIGHT: 229.19 LBS | BODY MASS INDEX: 35.97 KG/M2 | OXYGEN SATURATION: 97 % | RESPIRATION RATE: 20 BRPM

## 2023-07-04 DIAGNOSIS — N30.01 ACUTE CYSTITIS WITH HEMATURIA: ICD-10-CM

## 2023-07-04 DIAGNOSIS — R35.0 URINARY FREQUENCY: ICD-10-CM

## 2023-07-04 DIAGNOSIS — K59.00 CONSTIPATION, UNSPECIFIED CONSTIPATION TYPE: ICD-10-CM

## 2023-07-04 DIAGNOSIS — R30.0 DYSURIA: Primary | ICD-10-CM

## 2023-07-04 LAB
APPEARANCE UR: ABNORMAL
BACTERIA #/AREA URNS AUTO: ABNORMAL /HPF
BILIRUB UR QL STRIP.AUTO: NEGATIVE MG/DL
BILIRUB UR QL STRIP: POSITIVE
C TRACH DNA SPEC QL NAA+PROBE: NOT DETECTED
COLOR UR: YELLOW
GLUCOSE UR QL STRIP.AUTO: NORMAL MG/DL
GLUCOSE UR QL STRIP: NEGATIVE
HAV IGM SERPL QL IA: NONREACTIVE
HBV CORE IGM SERPL QL IA: NONREACTIVE
HBV SURFACE AG SERPL QL IA: NONREACTIVE
HCV AB SERPL QL IA: NONREACTIVE
HIV 1+2 AB+HIV1 P24 AG SERPL QL IA: NONREACTIVE
HYALINE CASTS #/AREA URNS LPF: ABNORMAL /LPF
KETONES UR QL STRIP.AUTO: NEGATIVE MG/DL
KETONES UR QL STRIP: NEGATIVE
LEUKOCYTE ESTERASE UR QL STRIP.AUTO: 500 UNIT/L
LEUKOCYTE ESTERASE UR QL STRIP: POSITIVE
N GONORRHOEA DNA SPEC QL NAA+PROBE: NOT DETECTED
NITRITE UR QL STRIP.AUTO: ABNORMAL
PH UR STRIP.AUTO: 8 [PH]
PH, POC UA: 7.5
POC BLOOD, URINE: POSITIVE
POC NITRATES, URINE: POSITIVE
PROT UR QL STRIP.AUTO: ABNORMAL MG/DL
PROT UR QL STRIP: POSITIVE
RBC #/AREA URNS AUTO: >100 /HPF
RBC UR QL AUTO: ABNORMAL UNIT/L
SP GR UR STRIP.AUTO: 1.02
SP GR UR STRIP: 1 (ref 1–1.03)
SQUAMOUS #/AREA URNS LPF: ABNORMAL /HPF
T PALLIDUM AB SER QL: NONREACTIVE
UROBILINOGEN UR STRIP-ACNC: 1 (ref 0.1–1.1)
UROBILINOGEN UR STRIP-ACNC: NORMAL MG/DL
WBC #/AREA URNS AUTO: >100 /HPF
WBC CLUMPS UR QL AUTO: ABNORMAL /HPF
YEAST BUDDING URNS QL: ABNORMAL /HPF

## 2023-07-04 PROCEDURE — 99214 PR OFFICE/OUTPT VISIT, EST, LEVL IV, 30-39 MIN: ICD-10-PCS | Mod: S$PBB,,, | Performed by: FAMILY MEDICINE

## 2023-07-04 PROCEDURE — 81001 URINALYSIS AUTO W/SCOPE: CPT | Performed by: FAMILY MEDICINE

## 2023-07-04 PROCEDURE — 86780 TREPONEMA PALLIDUM: CPT | Performed by: FAMILY MEDICINE

## 2023-07-04 PROCEDURE — 87088 URINE BACTERIA CULTURE: CPT | Performed by: FAMILY MEDICINE

## 2023-07-04 PROCEDURE — 87389 HIV-1 AG W/HIV-1&-2 AB AG IA: CPT | Performed by: FAMILY MEDICINE

## 2023-07-04 PROCEDURE — 87186 SC STD MICRODIL/AGAR DIL: CPT | Performed by: FAMILY MEDICINE

## 2023-07-04 PROCEDURE — 99213 OFFICE O/P EST LOW 20 MIN: CPT | Mod: PBBFAC | Performed by: FAMILY MEDICINE

## 2023-07-04 PROCEDURE — 36415 COLL VENOUS BLD VENIPUNCTURE: CPT | Performed by: FAMILY MEDICINE

## 2023-07-04 PROCEDURE — 99214 OFFICE O/P EST MOD 30 MIN: CPT | Mod: S$PBB,,, | Performed by: FAMILY MEDICINE

## 2023-07-04 PROCEDURE — 80074 ACUTE HEPATITIS PANEL: CPT | Performed by: FAMILY MEDICINE

## 2023-07-04 PROCEDURE — 81003 URINALYSIS AUTO W/O SCOPE: CPT | Mod: PBBFAC,59 | Performed by: FAMILY MEDICINE

## 2023-07-04 PROCEDURE — 87591 N.GONORRHOEAE DNA AMP PROB: CPT | Performed by: FAMILY MEDICINE

## 2023-07-04 RX ORDER — POLYETHYLENE GLYCOL 3350 17 G/17G
17 POWDER, FOR SOLUTION ORAL DAILY
Qty: 1530 G | Refills: 2 | Status: SHIPPED | OUTPATIENT
Start: 2023-07-04 | End: 2023-10-30

## 2023-07-04 RX ORDER — CIPROFLOXACIN 500 MG/1
500 TABLET ORAL 2 TIMES DAILY
Qty: 14 TABLET | Refills: 0 | Status: SHIPPED | OUTPATIENT
Start: 2023-07-04 | End: 2023-07-11

## 2023-07-04 NOTE — PROGRESS NOTES
"Subjective:       Patient ID: Karen Tapia is a 48 y.o. female.    Chief Complaint: Urinary Frequency (States is the 4th time this year. Reoccurring UTI's ), Dysuria (States is the 4th time this year. Reoccurring UTI's ), and STD Testing (States hasn't been sexually active since January 2023, however, requesting urine STD testing. )      Urinary Frequency   Associated symptoms include frequency.   Dysuria   Associated symptoms include frequency.   48-year-old female with increased urinary frequency.  She states this is probably her 4th UTI this year.  She is having dysuria she would like STD screening, denies high-risk sexual activity.  Review of Systems   Genitourinary:  Positive for dysuria and frequency.       Objective:       Vital Signs  Temp: 98.1 °F (36.7 °C)  Temp Source: Oral  Pulse: 71  Resp: 20  SpO2: 97 %  BP: 123/88  BP Location: Left arm  Patient Position: Sitting  Pain Score: 0-No pain  Height and Weight  Height: 5' 7" (170.2 cm)  Weight: 104 kg (229 lb 3.2 oz)  BSA (Calculated - sq m): 2.22 sq meters  BMI (Calculated): 35.9  Weight in (lb) to have BMI = 25: 159.3]  Physical Exam  Constitutional:       Appearance: Normal appearance.   HENT:      Head: Normocephalic and atraumatic.   Eyes:      Extraocular Movements: Extraocular movements intact.      Conjunctiva/sclera: Conjunctivae normal.   Cardiovascular:      Rate and Rhythm: Normal rate and regular rhythm.      Heart sounds: Normal heart sounds.   Pulmonary:      Breath sounds: Normal breath sounds.   Skin:     General: Skin is warm and dry.   Neurological:      General: No focal deficit present.      Mental Status: She is alert.   Psychiatric:         Mood and Affect: Mood and affect normal.         Speech: Speech normal.         Behavior: Behavior normal. Behavior is cooperative.         Thought Content: Thought content does not include homicidal or suicidal ideation.       Assessment:       Problem List Items Addressed This Visit  "   None  Visit Diagnoses       Dysuria    -  Primary    Relevant Orders    POCT Urinalysis, Dipstick, Automated, W/O Scope (Completed)    Urinary frequency        Relevant Orders    POCT Urinalysis, Dipstick, Automated, W/O Scope (Completed)    Acute cystitis with hematuria        Relevant Medications    ciprofloxacin HCl (CIPRO) 500 MG tablet    Other Relevant Orders    Urinalysis, Reflex to Urine Culture    Constipation, unspecified constipation type        Relevant Medications    polyethylene glycol (GLYCOLAX) 17 gram/dose powder            Plan:    Urine culture pending  Encouraged increased fluid intake  ER precautions  FU with PCP

## 2023-07-06 LAB
BACTERIA UR CULT: ABNORMAL
PATH REV: NORMAL

## 2023-07-09 ENCOUNTER — TELEPHONE (OUTPATIENT)
Dept: URGENT CARE | Facility: CLINIC | Age: 48
End: 2023-07-09
Payer: COMMERCIAL

## 2023-07-09 NOTE — TELEPHONE ENCOUNTER
----- Message from DENISSE King sent at 7/7/2023  9:28 AM CDT -----  Patient was treated with Cipro at the time of the original encounter. No changes to treatment plan necessary.

## 2023-07-29 ENCOUNTER — OFFICE VISIT (OUTPATIENT)
Dept: URGENT CARE | Facility: CLINIC | Age: 48
End: 2023-07-29
Payer: COMMERCIAL

## 2023-07-29 VITALS
BODY MASS INDEX: 36.67 KG/M2 | TEMPERATURE: 98 F | HEART RATE: 74 BPM | WEIGHT: 228.19 LBS | OXYGEN SATURATION: 98 % | HEIGHT: 66 IN | DIASTOLIC BLOOD PRESSURE: 83 MMHG | RESPIRATION RATE: 20 BRPM | SYSTOLIC BLOOD PRESSURE: 123 MMHG

## 2023-07-29 DIAGNOSIS — R35.0 URINARY FREQUENCY: Primary | ICD-10-CM

## 2023-07-29 DIAGNOSIS — N30.90 CYSTITIS: ICD-10-CM

## 2023-07-29 LAB
BILIRUB UR QL STRIP: NEGATIVE
CLUE CELLS VAG QL WET PREP: ABNORMAL
GLUCOSE UR QL STRIP: NEGATIVE
KETONES UR QL STRIP: NEGATIVE
LEUKOCYTE ESTERASE UR QL STRIP: POSITIVE
PH, POC UA: 5.5
POC BLOOD, URINE: POSITIVE
POC NITRATES, URINE: NEGATIVE
PROT UR QL STRIP: POSITIVE
SP GR UR STRIP: >=1.03 (ref 1–1.03)
T VAGINALIS VAG QL WET PREP: ABNORMAL
UROBILINOGEN UR STRIP-ACNC: 0.2 (ref 0.1–1.1)
WBC #/AREA VAG WET PREP: ABNORMAL
YEAST SPEC QL WET PREP: ABNORMAL

## 2023-07-29 PROCEDURE — 99213 OFFICE O/P EST LOW 20 MIN: CPT | Mod: S$PBB,,, | Performed by: FAMILY MEDICINE

## 2023-07-29 PROCEDURE — 99213 PR OFFICE/OUTPT VISIT, EST, LEVL III, 20-29 MIN: ICD-10-PCS | Mod: S$PBB,,, | Performed by: FAMILY MEDICINE

## 2023-07-29 PROCEDURE — 87210 SMEAR WET MOUNT SALINE/INK: CPT | Performed by: FAMILY MEDICINE

## 2023-07-29 PROCEDURE — 99214 OFFICE O/P EST MOD 30 MIN: CPT | Mod: PBBFAC | Performed by: FAMILY MEDICINE

## 2023-07-29 PROCEDURE — 87088 URINE BACTERIA CULTURE: CPT | Performed by: FAMILY MEDICINE

## 2023-07-29 PROCEDURE — 87661 TRICHOMONAS VAGINALIS AMPLIF: CPT | Performed by: FAMILY MEDICINE

## 2023-07-29 PROCEDURE — 81003 URINALYSIS AUTO W/O SCOPE: CPT | Mod: PBBFAC | Performed by: FAMILY MEDICINE

## 2023-07-29 RX ORDER — NORGESTREL AND ETHINYL ESTRADIOL 0.3-0.03MG
1 KIT ORAL
COMMUNITY
Start: 2023-06-26 | End: 2023-11-06

## 2023-07-29 RX ORDER — NITROFURANTOIN 25; 75 MG/1; MG/1
100 CAPSULE ORAL 2 TIMES DAILY
Qty: 14 CAPSULE | Refills: 0 | Status: SHIPPED | OUTPATIENT
Start: 2023-07-29 | End: 2023-08-05

## 2023-07-29 NOTE — PROGRESS NOTES
"Subjective:      Patient ID: Karen Tapia is a 48 y.o. female.    Vitals:  height is 5' 5.75" (1.67 m) and weight is 103.5 kg (228 lb 2.8 oz). Her temperature is 97.9 °F (36.6 °C). Her blood pressure is 123/83 and her pulse is 74. Her respiration is 20 and oxygen saturation is 98%.     Chief Complaint: Dysuria, Urinary Frequency (X 2days), and Vaginal Discharge    Patient with recurrent UTIs, having several days of dysuria, increased frequency, but possibly vaginal discharge?  States she felt the same only during last event which was earlier this month.  Urine culture then grew E coli, pansensitive, responded to Cipro, symptoms fully resolved.  Had GC and chlamydia checked at that time.  She has mild low back pain, no flank pain, no abdominal pain, no fever, no chronic diarrhea.  Chart reviewed    Dysuria   Associated symptoms include frequency.   Urinary Frequency   Associated symptoms include frequency.   Vaginal Discharge  The patient's primary symptoms include vaginal discharge. Associated symptoms include dysuria and frequency.       Genitourinary:  Positive for dysuria, frequency and vaginal discharge.      Objective:     Physical Exam   Constitutional:  Non-toxic appearance. She does not appear ill. No distress.   Neck: Neck supple.   Abdominal: Normal appearance. She exhibits no distension. flat abdomen There is no abdominal tenderness. There is no rebound, no guarding, no left CVA tenderness and no right CVA tenderness.   Neurological: no focal deficit. She is alert.   Skin: Skin is warm, dry and not diaphoretic.   Psychiatric: Her behavior is normal. Mood normal.   Nursing note and vitals reviewed.    Results for orders placed or performed in visit on 07/29/23   POCT Urinalysis, Dipstick, Automated, W/O Scope   Result Value Ref Range    POC Blood, Urine Positive (A) Negative    POC Bilirubin, Urine Negative Negative    POC Urobilinogen, Urine 0.2 0.1 - 1.1    POC Ketones, Urine Negative Negative    POC " Protein, Urine Positive (A) Negative    POC Nitrates, Urine Negative Negative    POC Glucose, Urine Negative Negative    pH, UA 5.5     POC Specific Gravity, Urine >=1.030 1.003 - 1.029    POC Leukocytes, Urine Positive (A) Negative       Assessment:     1. Urinary frequency    2. Cystitis        Plan:   Will give a course of nitrofurantoin.  Send urine for culture and Trichomonas testing, vaginal swab for wet prep.  Will notify of any positive results or if urine culture indicates the need to change antibiotics.  She will schedule a timely follow-up with Dr. Fonseca for further workup if indicated.    Please follow instructions on patient education material.  Return to urgent care in 2 to 3 days if symptoms are not improving, immediately if you develop any new or worsening symptoms.    Urinary frequency  -     POCT Urinalysis, Dipstick, Automated, W/O Scope  -     Wet Prep, Genital  -     T.vaginalisisc, Amplified RNA  -     Urine culture    Cystitis    Other orders  -     nitrofurantoin, macrocrystal-monohydrate, (MACROBID) 100 MG capsule; Take 1 capsule (100 mg total) by mouth 2 (two) times daily. for 7 days  Dispense: 14 capsule; Refill: 0

## 2023-07-29 NOTE — LETTER
July 29, 2023      [facility]  [address] [address]  Phone: [phone]       Patient: Karen Tapia   YOB: 1975  Date of Visit: 07/29/2023    To Whom It May Concern:    Loy Tapia  was at [facility] on 07/29/2023. The patient may return to work/school on JULY 30 2023 with no restrictions. If you have any questions or concerns, or if I can be of further assistance, please do not hesitate to contact me.    Sincerely,    DONTE NORIEGA MD

## 2023-07-31 ENCOUNTER — TELEPHONE (OUTPATIENT)
Dept: PRIMARY CARE CLINIC | Facility: CLINIC | Age: 48
End: 2023-07-31
Payer: COMMERCIAL

## 2023-07-31 ENCOUNTER — PATIENT MESSAGE (OUTPATIENT)
Dept: PRIMARY CARE CLINIC | Facility: CLINIC | Age: 48
End: 2023-07-31
Payer: COMMERCIAL

## 2023-07-31 DIAGNOSIS — N39.0 RECURRENT UTI: Primary | ICD-10-CM

## 2023-07-31 LAB — BACTERIA UR CULT: ABNORMAL

## 2023-07-31 RX ORDER — AMOXICILLIN 875 MG/1
875 TABLET, FILM COATED ORAL EVERY 12 HOURS
Qty: 20 TABLET | Refills: 0 | Status: SHIPPED | OUTPATIENT
Start: 2023-07-31 | End: 2023-08-10

## 2023-07-31 NOTE — TELEPHONE ENCOUNTER
----- Message from Luda Fonseca MD sent at 7/31/2023  4:09 PM CDT -----  Please notify patient of referral to Urogynecology due to recurrent UTI's.

## 2023-08-02 ENCOUNTER — TELEPHONE (OUTPATIENT)
Dept: URGENT CARE | Facility: CLINIC | Age: 48
End: 2023-08-02
Payer: COMMERCIAL

## 2023-08-02 LAB
SPECIMEN SOURCE: NORMAL
T VAGINALIS RRNA SPEC QL NAA+PROBE: NEGATIVE

## 2023-08-02 NOTE — TELEPHONE ENCOUNTER
----- Message from DENISSE Phelan sent at 7/31/2023  9:19 AM CDT -----  Stop macrobid.  Change to Amoxil.  [facility]

## 2023-08-04 ENCOUNTER — TELEPHONE (OUTPATIENT)
Dept: PRIMARY CARE CLINIC | Facility: CLINIC | Age: 48
End: 2023-08-04
Payer: COMMERCIAL

## 2023-08-04 DIAGNOSIS — N39.0 RECURRENT UTI: Primary | ICD-10-CM

## 2023-08-04 NOTE — TELEPHONE ENCOUNTER
----- Message from Betina Dalton sent at 8/4/2023 11:23 AM CDT -----  Regarding: referral correction  Uro/GYN referral for Dr Fragoso  Needs to be internal    Can you please correct it?    Thank you  Betina

## 2023-08-11 ENCOUNTER — TELEPHONE (OUTPATIENT)
Dept: UROGYNECOLOGY | Facility: CLINIC | Age: 48
End: 2023-08-11
Payer: COMMERCIAL

## 2023-08-14 ENCOUNTER — OFFICE VISIT (OUTPATIENT)
Dept: URGENT CARE | Facility: CLINIC | Age: 48
End: 2023-08-14
Payer: COMMERCIAL

## 2023-08-14 VITALS
RESPIRATION RATE: 20 BRPM | OXYGEN SATURATION: 100 % | DIASTOLIC BLOOD PRESSURE: 73 MMHG | HEART RATE: 74 BPM | BODY MASS INDEX: 35.79 KG/M2 | HEIGHT: 67 IN | TEMPERATURE: 99 F | WEIGHT: 228 LBS | SYSTOLIC BLOOD PRESSURE: 137 MMHG

## 2023-08-14 DIAGNOSIS — Z32.02 NEGATIVE PREGNANCY TEST: ICD-10-CM

## 2023-08-14 DIAGNOSIS — R53.83 FATIGUE, UNSPECIFIED TYPE: ICD-10-CM

## 2023-08-14 DIAGNOSIS — R11.0 NAUSEA: Primary | ICD-10-CM

## 2023-08-14 DIAGNOSIS — E11.9 TYPE 2 DIABETES MELLITUS WITHOUT COMPLICATION, WITHOUT LONG-TERM CURRENT USE OF INSULIN: ICD-10-CM

## 2023-08-14 LAB
B-HCG UR QL: NEGATIVE
CTP QC/QA: YES
GLUCOSE SERPL-MCNC: 99 MG/DL (ref 70–110)
SARS-COV-2 RNA RESP QL NAA+PROBE: NOT DETECTED

## 2023-08-14 PROCEDURE — 82962 GLUCOSE BLOOD TEST: CPT | Mod: PBBFAC

## 2023-08-14 PROCEDURE — 87635 SARS-COV-2 COVID-19 AMP PRB: CPT

## 2023-08-14 PROCEDURE — 99215 OFFICE O/P EST HI 40 MIN: CPT | Mod: PBBFAC

## 2023-08-14 PROCEDURE — 99214 PR OFFICE/OUTPT VISIT, EST, LEVL IV, 30-39 MIN: ICD-10-PCS | Mod: S$PBB,,,

## 2023-08-14 PROCEDURE — 81025 URINE PREGNANCY TEST: CPT | Mod: PBBFAC

## 2023-08-14 PROCEDURE — 99214 OFFICE O/P EST MOD 30 MIN: CPT | Mod: S$PBB,,,

## 2023-08-14 NOTE — PROGRESS NOTES
"Subjective:      Patient ID: Karen Tapia is a 48 y.o. female.    Vitals:  height is 5' 7" (1.702 m) and weight is 103.4 kg (228 lb). Her oral temperature is 98.5 °F (36.9 °C). Her blood pressure is 137/73 and her pulse is 74. Her respiration is 20 and oxygen saturation is 100%.     Chief Complaint: Nausea and Fatigue (Patient states she works in the heat and drink lots a fluids and is exhausted and nauseated. Feeling bad x 2 days)    Cc as above. States symptoms present when at work while working in the heat. She works at a car [employer]. States symptoms improved once she goes inside and rest. She is drinking oral fluids.     Nausea  This is a new problem. The current episode started in the past 7 days. The problem occurs intermittently. The problem has been resolved. Associated symptoms include fatigue and nausea. Pertinent negatives include no congestion, coughing, vertigo, visual change or vomiting. The symptoms are aggravated by exertion (prolong exposure to heat). She has tried rest for the symptoms. The treatment provided mild relief.   Fatigue  This is a new problem. The current episode started in the past 7 days. The problem occurs intermittently. The problem has been gradually improving. Associated symptoms include fatigue and nausea. Pertinent negatives include no congestion, coughing, vertigo, visual change or vomiting. The symptoms are aggravated by exertion (prolong exposure in heat). She has tried rest for the symptoms. The treatment provided mild relief.       Constitution: Positive for fatigue.   HENT:  Negative for congestion.    Respiratory:  Negative for cough.    Gastrointestinal:  Positive for nausea. Negative for vomiting.   Neurological:  Negative for history of vertigo.      Objective:     Physical Exam   Constitutional: She is oriented to person, place, and time.  Non-toxic appearance. She does not appear ill. No distress.   HENT:   Head: Normocephalic and atraumatic.   Nose: Nose normal. "   Eyes: Conjunctivae are normal. Pupils are equal, round, and reactive to light.   Neck: Neck supple.   Cardiovascular: Normal rate, regular rhythm, normal heart sounds and normal pulses.   Pulmonary/Chest: Effort normal and breath sounds normal. No respiratory distress.   Abdominal: Normal appearance.   Musculoskeletal: Normal range of motion.         General: Normal range of motion.   Neurological: no focal deficit. She is alert and oriented to person, place, and time.   Skin: Skin is warm, dry, not diaphoretic and not pale.   Psychiatric: Her behavior is normal. Mood and thought content normal.   Nursing note and vitals reviewed.      Assessment:     1. Nausea    2. Fatigue, unspecified type    3. Type 2 diabetes mellitus without complication, without long-term current use of insulin    4. Negative pregnancy test      Results for orders placed or performed in visit on 08/14/23   POCT urine pregnancy   Result Value Ref Range    POC Preg Test, Ur Negative Negative     Acceptable Yes    POCT Glucose, Hand-Held Device   Result Value Ref Range    POC Glucose 99 70 - 110 MG/DL        Plan:       Nausea  -     POCT urine pregnancy  -     COVID-19 Routine Screening    Fatigue, unspecified type  -     POCT urine pregnancy    Type 2 diabetes mellitus without complication, without long-term current use of insulin  -     POCT Glucose, Hand-Held Device    Negative pregnancy test  -     POCT urine pregnancy    Rest. Drink plenty of fluids to stay hydrated. When working in the heat, take multiple break and drink plenty of fluids. Do not skip meals. ER precautions provided.

## 2023-08-14 NOTE — LETTER
August 14, 2023      [facility]  [address] [address]  Phone: [phone]       Patient: Karen Tapia   YOB: 1975  Date of Visit: 08/14/2023    To Whom It May Concern:    Loy Tapia  was at [facility] on 08/14/2023. The patient may return to work/school on 08/16/2023 with no restrictions. If you have any questions or concerns, or if I can be of further assistance, please do not hesitate to contact me.    Sincerely,        Crystal Llanos NP

## 2023-08-14 NOTE — PATIENT INSTRUCTIONS
Rest. Drink plenty of fluids to stay hydrated. When working in the heat taking multiple break and drink plenty of fluids. Make to eat meals and snack, do not skip meals. Go to the nearest ER for worsening symptoms, such as fainting, light headedness, chest pain, shortness of breath, palpitations, etc.   COVID test pending. The clinic will call with positive results.

## 2023-08-24 ENCOUNTER — DOCUMENTATION ONLY (OUTPATIENT)
Dept: PRIMARY CARE CLINIC | Facility: CLINIC | Age: 48
End: 2023-08-24

## 2023-08-24 ENCOUNTER — OFFICE VISIT (OUTPATIENT)
Dept: PRIMARY CARE CLINIC | Facility: CLINIC | Age: 48
End: 2023-08-24
Payer: COMMERCIAL

## 2023-08-24 VITALS
BODY MASS INDEX: 35.18 KG/M2 | HEART RATE: 81 BPM | DIASTOLIC BLOOD PRESSURE: 72 MMHG | OXYGEN SATURATION: 95 % | TEMPERATURE: 98 F | WEIGHT: 224.63 LBS | SYSTOLIC BLOOD PRESSURE: 105 MMHG

## 2023-08-24 DIAGNOSIS — N30.01 ACUTE CYSTITIS WITH HEMATURIA: Primary | ICD-10-CM

## 2023-08-24 LAB
BILIRUB SERPL-MCNC: 2 MG/DL
BLOOD URINE, POC: 10
CLARITY, POC UA: NORMAL
COLOR, POC UA: NORMAL
GLUCOSE UR QL STRIP: NORMAL
KETONES UR QL STRIP: NORMAL
LEUKOCYTE ESTERASE URINE, POC: 25
NITRITE, POC UA: NORMAL
PH, POC UA: 5
PROTEIN, POC: 30
SPECIFIC GRAVITY, POC UA: 1.03
UROBILINOGEN, POC UA: 4

## 2023-08-24 PROCEDURE — 87077 CULTURE AEROBIC IDENTIFY: CPT | Performed by: FAMILY MEDICINE

## 2023-08-24 PROCEDURE — 3074F SYST BP LT 130 MM HG: CPT | Mod: CPTII,,, | Performed by: FAMILY MEDICINE

## 2023-08-24 PROCEDURE — 99213 PR OFFICE/OUTPT VISIT, EST, LEVL III, 20-29 MIN: ICD-10-PCS | Mod: ,,, | Performed by: FAMILY MEDICINE

## 2023-08-24 PROCEDURE — 3078F DIAST BP <80 MM HG: CPT | Mod: CPTII,,, | Performed by: FAMILY MEDICINE

## 2023-08-24 PROCEDURE — 1159F MED LIST DOCD IN RCRD: CPT | Mod: CPTII,,, | Performed by: FAMILY MEDICINE

## 2023-08-24 PROCEDURE — 1160F RVW MEDS BY RX/DR IN RCRD: CPT | Mod: CPTII,,, | Performed by: FAMILY MEDICINE

## 2023-08-24 PROCEDURE — 3078F PR MOST RECENT DIASTOLIC BLOOD PRESSURE < 80 MM HG: ICD-10-PCS | Mod: CPTII,,, | Performed by: FAMILY MEDICINE

## 2023-08-24 PROCEDURE — 1159F PR MEDICATION LIST DOCUMENTED IN MEDICAL RECORD: ICD-10-PCS | Mod: CPTII,,, | Performed by: FAMILY MEDICINE

## 2023-08-24 PROCEDURE — 3008F PR BODY MASS INDEX (BMI) DOCUMENTED: ICD-10-PCS | Mod: CPTII,,, | Performed by: FAMILY MEDICINE

## 2023-08-24 PROCEDURE — 87184 SC STD DISK METHOD PER PLATE: CPT | Performed by: FAMILY MEDICINE

## 2023-08-24 PROCEDURE — 99213 OFFICE O/P EST LOW 20 MIN: CPT | Mod: ,,, | Performed by: FAMILY MEDICINE

## 2023-08-24 PROCEDURE — 3008F BODY MASS INDEX DOCD: CPT | Mod: CPTII,,, | Performed by: FAMILY MEDICINE

## 2023-08-24 PROCEDURE — 1160F PR REVIEW ALL MEDS BY PRESCRIBER/CLIN PHARMACIST DOCUMENTED: ICD-10-PCS | Mod: CPTII,,, | Performed by: FAMILY MEDICINE

## 2023-08-24 PROCEDURE — 3074F PR MOST RECENT SYSTOLIC BLOOD PRESSURE < 130 MM HG: ICD-10-PCS | Mod: CPTII,,, | Performed by: FAMILY MEDICINE

## 2023-08-24 RX ORDER — SULFAMETHOXAZOLE AND TRIMETHOPRIM 800; 160 MG/1; MG/1
1 TABLET ORAL 2 TIMES DAILY
Qty: 10 TABLET | Refills: 0 | Status: SHIPPED | OUTPATIENT
Start: 2023-08-24 | End: 2023-08-29

## 2023-08-24 NOTE — PROGRESS NOTES
Subjective:      Patient ID: Karen Tapia is a 48 y.o. female.    Chief Complaint: Urinary Tract Infection    Disclaimer:  This note is prepared using voice recognition software and as such is likely to have errors despite attempts at proofreading. Please contact me for questions.     48yoF with history of recurrent UTIs presents for evaluation.   Urinary Tract Infection   This is a recurrent problem. The current episode started in the past 7 days. The problem occurs intermittently. The problem has been unchanged. The patient is experiencing no pain. There has been no fever. She is Sexually active. There is No history of pyelonephritis. Associated symptoms include urgency and withholding. Pertinent negatives include no behavior changes, chills, flank pain, frequency, hesitancy, nausea, vomiting, constipation or rash. Treatments tried: OTC Medications for UTI. The treatment provided mild relief. Her past medical history is significant for hypertension and recurrent UTIs.   Patient has scheduled appointment with Urogynecology on 09/19/2023.    Past Medical History:   Diagnosis Date    Fracture of triquetrum 06/17/2020    Obesity 12/21/2022    Osteoarthritis 12/21/2022    Personal history of colonic polyps     Plantar fasciitis of right foot 12/21/2022    Prediabetes 12/21/2022    Primary hypertension 12/21/2022    Vitamin D deficiency 12/21/2022        Current Outpatient Medications on File Prior to Visit   Medication Sig Dispense Refill    ergocalciferol (ERGOCALCIFEROL) 50,000 unit Cap Take 50,000 Units by mouth every 7 days.      metFORMIN (GLUCOPHAGE-XR) 500 MG ER 24hr tablet Take 1 tablet (500 mg total) by mouth once daily. 90 tablet 3    polyethylene glycol (GLYCOLAX) 17 gram/dose powder Take 17 g by mouth once daily. 1530 g 2    LOW-OGESTREL, 28, 0.3-30 mg-mcg per tablet Take 1 tablet by mouth.       No current facility-administered medications on file prior to visit.        Review of patient's allergies  indicates:   Allergen Reactions    Codeine Rash        Review of Systems   Constitutional:  Negative for chills, diaphoresis and fever.   Eyes:  Negative for blurred vision and double vision.   Respiratory:  Negative for cough and shortness of breath.    Cardiovascular:  Negative for chest pain and leg swelling.   Gastrointestinal:  Negative for constipation, diarrhea, nausea and vomiting.        Abdominal fullness/bladder pressure   Genitourinary:  Positive for urgency. Negative for dysuria, flank pain, frequency and hesitancy.   Skin:  Negative for rash.   Neurological:  Negative for dizziness, tremors and headaches.       Objective:     Vitals:    08/24/23 1325   BP: 105/72   BP Location: Left arm   Pulse: 81   Temp: 98 °F (36.7 °C)   SpO2: 95%   Weight: 101.9 kg (224 lb 9.6 oz)     Physical Exam  Vitals reviewed.   Constitutional:       General: She is not in acute distress.     Appearance: Normal appearance. She is not ill-appearing, toxic-appearing or diaphoretic.   HENT:      Head: Normocephalic and atraumatic.      Right Ear: External ear normal.      Left Ear: External ear normal.   Eyes:      General:         Right eye: No discharge.         Left eye: No discharge.      Extraocular Movements: Extraocular movements intact.      Conjunctiva/sclera: Conjunctivae normal.   Cardiovascular:      Rate and Rhythm: Normal rate and regular rhythm.      Heart sounds: Normal heart sounds. No murmur heard.     No friction rub. No gallop.   Pulmonary:      Effort: Pulmonary effort is normal. No accessory muscle usage or respiratory distress.      Breath sounds: Normal breath sounds and air entry. No stridor. No wheezing, rhonchi or rales.   Abdominal:      General: Bowel sounds are normal. There is no distension.      Palpations: Abdomen is soft. There is no mass.      Tenderness: There is no abdominal tenderness. There is no right CVA tenderness, left CVA tenderness, guarding or rebound.   Musculoskeletal:          General: Normal range of motion.      Cervical back: Normal range of motion.   Skin:     General: Skin is warm and dry.   Neurological:      General: No focal deficit present.      Mental Status: She is alert and oriented to person, place, and time. Mental status is at baseline.   Psychiatric:         Mood and Affect: Mood normal.         Behavior: Behavior normal.         Thought Content: Thought content normal.         Judgment: Judgment normal.         Assessment:     1. Acute cystitis with hematuria      Plan:     1. Acute cystitis with hematuria  - Urine Culture High Risk; Future  - sulfamethoxazole-trimethoprim 800-160mg (BACTRIM DS) 800-160 mg Tab; Take 1 tablet by mouth 2 (two) times daily. for 5 days  Dispense: 10 tablet; Refill: 0  - Urine Culture High Risk     POC Urine dip positive for nitrites, blood, and protein.  Keep scheduled follow up with Urogynecology.  Start antibiotic today  Watch closely for worsening symptoms  Contact this clinic for any further problems  Urine culture results will be monitored and reported, treatment changes will be made if necessary    Follow up if symptoms worsen or fail to improve.    Karen Regina was given education on their disease process and medications.

## 2023-08-26 LAB — BACTERIA UR CULT: ABNORMAL

## 2023-08-28 ENCOUNTER — TELEPHONE (OUTPATIENT)
Dept: PRIMARY CARE CLINIC | Facility: CLINIC | Age: 48
End: 2023-08-28
Payer: COMMERCIAL

## 2023-08-28 NOTE — TELEPHONE ENCOUNTER
----- Message from Luda Fonseca MD sent at 8/28/2023  8:36 AM CDT -----  MRSA on urine culture that is sensitive to Bactrim. Complete Rx as previously prescribed.

## 2023-08-30 ENCOUNTER — TELEPHONE (OUTPATIENT)
Dept: PRIMARY CARE CLINIC | Facility: CLINIC | Age: 48
End: 2023-08-30
Payer: COMMERCIAL

## 2023-09-15 ENCOUNTER — CLINICAL SUPPORT (OUTPATIENT)
Dept: UROGYNECOLOGY | Facility: CLINIC | Age: 48
End: 2023-09-15
Payer: COMMERCIAL

## 2023-09-15 DIAGNOSIS — N39.3 STRESS INCONTINENCE OF URINE: Primary | ICD-10-CM

## 2023-09-15 PROCEDURE — 99499 NO LOS: ICD-10-PCS | Mod: S$GLB,,, | Performed by: OBSTETRICS & GYNECOLOGY

## 2023-09-15 PROCEDURE — 99499 UNLISTED E&M SERVICE: CPT | Mod: S$GLB,,, | Performed by: OBSTETRICS & GYNECOLOGY

## 2023-09-18 ENCOUNTER — OFFICE VISIT (OUTPATIENT)
Dept: PRIMARY CARE CLINIC | Facility: CLINIC | Age: 48
End: 2023-09-18
Payer: COMMERCIAL

## 2023-09-18 VITALS
HEIGHT: 67 IN | SYSTOLIC BLOOD PRESSURE: 115 MMHG | DIASTOLIC BLOOD PRESSURE: 64 MMHG | OXYGEN SATURATION: 96 % | HEART RATE: 70 BPM | WEIGHT: 220 LBS | BODY MASS INDEX: 34.53 KG/M2

## 2023-09-18 DIAGNOSIS — Z13.820 ENCOUNTER FOR SCREENING FOR OSTEOPOROSIS: ICD-10-CM

## 2023-09-18 DIAGNOSIS — F32.A DEPRESSIVE EPISODE: ICD-10-CM

## 2023-09-18 DIAGNOSIS — E55.9 VITAMIN D DEFICIENCY: ICD-10-CM

## 2023-09-18 DIAGNOSIS — N39.0 FREQUENT UTI: ICD-10-CM

## 2023-09-18 DIAGNOSIS — R73.03 PREDIABETES: ICD-10-CM

## 2023-09-18 DIAGNOSIS — Z00.00 ENCOUNTER FOR PREVENTATIVE ADULT HEALTH CARE EXAMINATION: Primary | ICD-10-CM

## 2023-09-18 DIAGNOSIS — I10 PRIMARY HYPERTENSION: Chronic | ICD-10-CM

## 2023-09-18 DIAGNOSIS — Z86.010 PERSONAL HISTORY OF COLONIC POLYPS: ICD-10-CM

## 2023-09-18 LAB
APPEARANCE UR: CLEAR
BACTERIA #/AREA URNS AUTO: NORMAL /HPF
BILIRUB UR QL STRIP.AUTO: NEGATIVE
COLOR UR: YELLOW
GLUCOSE UR QL STRIP.AUTO: NEGATIVE
KETONES UR QL STRIP.AUTO: NEGATIVE
LEUKOCYTE ESTERASE UR QL STRIP.AUTO: ABNORMAL
NITRITE UR QL STRIP.AUTO: NEGATIVE
PH UR STRIP.AUTO: 6 [PH]
PROT UR QL STRIP.AUTO: NEGATIVE
RBC #/AREA URNS AUTO: <5 /HPF
RBC UR QL AUTO: NEGATIVE
SP GR UR STRIP.AUTO: 1.01 (ref 1–1.03)
SQUAMOUS #/AREA URNS AUTO: <5 /HPF
UROBILINOGEN UR STRIP-ACNC: 0.2
WBC #/AREA URNS AUTO: <5 /HPF

## 2023-09-18 PROCEDURE — 3008F PR BODY MASS INDEX (BMI) DOCUMENTED: ICD-10-PCS | Mod: CPTII,,, | Performed by: FAMILY MEDICINE

## 2023-09-18 PROCEDURE — 1160F PR REVIEW ALL MEDS BY PRESCRIBER/CLIN PHARMACIST DOCUMENTED: ICD-10-PCS | Mod: CPTII,,, | Performed by: FAMILY MEDICINE

## 2023-09-18 PROCEDURE — 99396 PREV VISIT EST AGE 40-64: CPT | Mod: ,,, | Performed by: FAMILY MEDICINE

## 2023-09-18 PROCEDURE — 3074F PR MOST RECENT SYSTOLIC BLOOD PRESSURE < 130 MM HG: ICD-10-PCS | Mod: CPTII,,, | Performed by: FAMILY MEDICINE

## 2023-09-18 PROCEDURE — 1159F MED LIST DOCD IN RCRD: CPT | Mod: CPTII,,, | Performed by: FAMILY MEDICINE

## 2023-09-18 PROCEDURE — 1160F RVW MEDS BY RX/DR IN RCRD: CPT | Mod: CPTII,,, | Performed by: FAMILY MEDICINE

## 2023-09-18 PROCEDURE — 3074F SYST BP LT 130 MM HG: CPT | Mod: CPTII,,, | Performed by: FAMILY MEDICINE

## 2023-09-18 PROCEDURE — 3078F PR MOST RECENT DIASTOLIC BLOOD PRESSURE < 80 MM HG: ICD-10-PCS | Mod: CPTII,,, | Performed by: FAMILY MEDICINE

## 2023-09-18 PROCEDURE — 3008F BODY MASS INDEX DOCD: CPT | Mod: CPTII,,, | Performed by: FAMILY MEDICINE

## 2023-09-18 PROCEDURE — 3078F DIAST BP <80 MM HG: CPT | Mod: CPTII,,, | Performed by: FAMILY MEDICINE

## 2023-09-18 PROCEDURE — 81001 URINALYSIS AUTO W/SCOPE: CPT | Performed by: FAMILY MEDICINE

## 2023-09-18 PROCEDURE — 99396 PR PREVENTIVE VISIT,EST,40-64: ICD-10-PCS | Mod: ,,, | Performed by: FAMILY MEDICINE

## 2023-09-18 PROCEDURE — 1159F PR MEDICATION LIST DOCUMENTED IN MEDICAL RECORD: ICD-10-PCS | Mod: CPTII,,, | Performed by: FAMILY MEDICINE

## 2023-09-18 NOTE — PROGRESS NOTES
Patient ID: [id number]     Chief Complaint: Follow-up (3 MTHs)        HPI:   Disclaimer:  This note is prepared using voice recognition software and as such is likely to have errors despite attempts at proofreading. Please contact me for questions.     Karen Tapia is a 48 y.o. female here today for an annual wellness visit.   Patient states she has been having symptoms of depression including sadness, crying spells, and fatigue. She admits to multiple stressors at home and work including difficulty with family dynamics. The patient denies suicidal ideation and states her children have encouraged her to enlist a therapist for help. She is scheduled for neurology appointment on 11/2023 due to hx of memory deficits. The patient is a non-smoker. She is due for updated lab work.    Cervical Cancer Screening - Last Pap 12/2022 at [facility]. Records to be requested.  Breast Cancer Screening - Last Mammogram 06/2023 BIRADS 1, negative. Repeat in 1 year.  Colon Cancer Screening - Colonoscopy on 09/2022, colon polyps s/p polypectomy. Repeats in 5 years.  Osteoporosis Screening - Not yet due.  Eye Exam - Last eye exam within last year,  will request records.  Dental Exam - Last dental exam > 1 year due to financial constraints. Recommend biannual exams and discuss payment plan with dentists.  Vaccinations -   Immunization History   Administered Date(s) Administered    COVID-19, MRNA, LN-S, PF (Pfizer) (Purple Cap) 06/08/2021, 06/29/2021    DTP 1973, 1973, 05/30/1974, 07/20/1978    Hepatitis B, Adult 09/20/2018    IPV 1973, 1973, 06/06/1974, 07/20/1978, 11/15/1979    Influenza 10/31/2007, 01/09/2014, 10/06/2021    Influenza - Quadrivalent 12/15/2016    Influenza - Quadrivalent - MDCK - PF 09/24/2022    Influenza - Quadrivalent - PF *Preferred* (6 months and older) 09/25/2020    Influenza - Trivalent - PF (ADULT) 10/31/2007, 01/09/2014, 11/01/2017, 09/06/2018, 10/24/2019, 10/06/2021    MMR  11/15/1979, 1993    PPD Test 2018    Td - PF (ADULT) 11/15/1979, 1989, 02/10/2005   Due for Tdap and Zoster vaccine. Patient given education about vaccinations and will notify MD of decision to proceed with vaccination.    Past Medical History:   Diagnosis Date    Anemia     Fracture of triquetrum 2020    Obesity 2022    Osteoarthritis 2022    Personal history of colonic polyps     Plantar fasciitis of right foot 2022    Prediabetes 2022    Primary hypertension 2022    Urinary tract infection     Vitamin D deficiency 2022        Past Surgical History:   Procedure Laterality Date     SECTION      COLONOSCOPY  2022       Review of patient's allergies indicates:   Allergen Reactions    Codeine Rash       Outpatient Medications Marked as Taking for the 23 encounter (Office Visit) with Luda Fonseca MD   Medication Sig Dispense Refill    metFORMIN (GLUCOPHAGE-XR) 500 MG ER 24hr tablet Take 1 tablet (500 mg total) by mouth once daily. 90 tablet 3       Social History     Socioeconomic History    Marital status: Single   Tobacco Use    Smoking status: Never    Smokeless tobacco: Never   Substance and Sexual Activity    Alcohol use: Yes    Drug use: Never    Sexual activity: Yes     Partners: Male     Birth control/protection: None     Social Determinants of Health     Financial Resource Strain: Low Risk  (6/15/2023)    Overall Financial Resource Strain (CARDIA)     Difficulty of Paying Living Expenses: Not hard at all   Food Insecurity: No Food Insecurity (6/15/2023)    Hunger Vital Sign     Worried About Running Out of Food in the Last Year: Never true     Ran Out of Food in the Last Year: Never true   Transportation Needs: No Transportation Needs (6/15/2023)    PRAPARE - Transportation     Lack of Transportation (Medical): No     Lack of Transportation (Non-Medical): No   Physical Activity: Sufficiently Active (6/15/2023)    Exercise  Vital Sign     Days of Exercise per Week: 7 days     Minutes of Exercise per Session: 30 min   Stress: No Stress Concern Present (6/15/2023)    [employer] - Occupational Stress Questionnaire     Feeling of Stress : Only a little   Social Connections: Socially Isolated (6/15/2023)    Social Connection and Isolation Panel [NHANES]     Frequency of Communication with Friends and Family: More than three times a week     Frequency of Social Gatherings with Friends and Family: More than three times a week     Attends Buddhism Services: Never     Active Member of Clubs or Organizations: No     Attends Club or Organization Meetings: Never     Marital Status: Never    Housing Stability: Unknown (6/15/2023)    Housing Stability Vital Sign     Unable to Pay for Housing in the Last Year: No     Unstable Housing in the Last Year: No        Family History   Problem Relation Age of Onset    Hypertension Father     Cancer Mother     Alzheimer's disease Mother     Diabetes Mother     Hypertension Mother     Pancreatic cancer Mother     Cancer Maternal Grandmother         Lab Results   Component Value Date    WBC 5.3 12/21/2022    HGB 12.0 12/21/2022    HCT 37.1 12/21/2022     12/21/2022    CHOL 175 12/21/2022    TRIG 100 12/21/2022    HDL 48 12/21/2022    ALT 19 12/21/2022    AST 19 12/21/2022     12/21/2022    K 4.4 12/21/2022    CREATININE 0.93 12/21/2022    BUN 7.2 12/21/2022    CO2 29 12/21/2022    TSH 1.747 12/21/2022    HGBA1C 6.3 12/21/2022       Mammo Digital Screening Bilat w/ Luis   - MAMMO DIGITAL SCREENING BILAT WITH LUIS    BILATERAL DIGITAL SCREENING MAMMOGRAM 3D/2D WITH CAD: 6/27/2023  HISTORY: 48-year-old woman presents for screening mammogram.      COMPARISONS: Comparison is made to exams dated:  6/15/2020 mammogram - [facility], 9/1/2017 mammogram, 8/24/2016 mammogram, 2/5/2015 mammogram, and 2/23/2015 mammogram - [facility].      TECHNIQUE:  "Digital mammography views were performed with tomosynthesis. Current study was evaluated with a Computer Aided Detection (CAD) system.     BREAST COMPOSITION: There are scattered areas of fibroglandular tissue.      FINDINGS:   No suspicious mass, asymmetry, distortion, or calcification is identified.     IMPRESSION: NEGATIVE  Right Breast: Negative (BI-RADS 1)  Left Breast: Negative (BI-RADS 1)    Recommendations:  Recommend continued annual screening mammography, according to American College of Radiology guidelines.    Hermann Taylor M.D.            lm/:6/27/2023 17:33:47      letter sent: Mammography Normal    Mammogram BI-RADS: 1 Negative       Subjective:     Review of Systems:   Review of Systems   Constitutional:  Negative for chills, diaphoresis and fever.   Eyes:  Negative for blurred vision and double vision.   Respiratory:  Negative for cough and shortness of breath.    Cardiovascular:  Negative for chest pain and leg swelling.   Gastrointestinal:  Negative for abdominal pain, diarrhea, nausea and vomiting.   Skin:  Negative for rash.   Neurological:  Negative for dizziness, tremors and headaches.   Psychiatric/Behavioral:  Positive for depression. Negative for suicidal ideas.    See HPI for details    Objective:     /64   Pulse 70   Ht 5' 7" (1.702 m)   Wt 99.8 kg (220 lb)   SpO2 96%   BMI 34.46 kg/m²     Physical Exam:  Physical Exam  Constitutional:       General: She is not in acute distress.     Appearance: She is not toxic-appearing or diaphoretic.   HENT:      Head: Normocephalic and atraumatic.      Right Ear: Tympanic membrane, ear canal and external ear normal. There is no impacted cerumen.      Left Ear: Tympanic membrane, ear canal and external ear normal. There is no impacted cerumen.      Nose: Nose normal.      Mouth/Throat:      Mouth: Mucous membranes are moist.      Pharynx: Oropharynx is clear. No oropharyngeal exudate or posterior oropharyngeal erythema.   Eyes:      " General: No scleral icterus.     Extraocular Movements: Extraocular movements intact.      Conjunctiva/sclera: Conjunctivae normal.   Cardiovascular:      Rate and Rhythm: Normal rate and regular rhythm.      Heart sounds: Normal heart sounds. No murmur heard.     No friction rub. No gallop.   Pulmonary:      Effort: Pulmonary effort is normal. No respiratory distress.      Breath sounds: Normal breath sounds. No stridor. No wheezing, rhonchi or rales.   Musculoskeletal:         General: Normal range of motion.      Cervical back: Normal range of motion and neck supple. No rigidity.   Lymphadenopathy:      Cervical: No cervical adenopathy.   Skin:     General: Skin is warm and dry.      Coloration: Skin is not pale.   Neurological:      General: No focal deficit present.      Mental Status: She is alert and oriented to person, place, and time. Mental status is at baseline.   Psychiatric:         Mood and Affect: Mood is depressed. Affect is tearful.         Behavior: Behavior normal. Behavior is cooperative.         Thought Content: Thought content normal.         Judgment: Judgment normal.         Assessment:       ICD-10-CM ICD-9-CM   1. Encounter for preventative adult health care examination  Z00.00 V70.0   2. Primary hypertension  I10 401.9   3. Prediabetes  R73.03 790.29   4. Depressive episode  F32.A 311   5. Frequent UTI  N39.0 599.0   6. Vitamin D deficiency  E55.9 268.9   7. Encounter for screening for osteoporosis  Z13.820 V82.81   8. Personal history of colonic polyps  Z86.010 V12.72        Plan:     Health Maintenance Topics with due status: Not Due       Topic Last Completion Date    Colorectal Cancer Screening 09/01/2022    Lipid Panel 12/21/2022    Cervical Cancer Screening 12/28/2022    Mammogram 06/27/2023      1. Encounter for preventative adult health care examination  - Hemoglobin A1C; Future  Recommend annual eye exam and biannual dental exams.  Limit caffeine and alcohol intake.  Well balanced  diet low in sugar/complex carbohydrates and increased vegetable intake encouraged.  Moderate intensity exercise 30 min/day at least 5 days/Wk (total 150 min/Wk) recommended.  Maintain healthy BMI which may include weight loss.  Labs reviewed today in clinic.  See above preventative health screening at today's visit.    2. Primary hypertension  BP at goal  Low sodium diet and exercise recommended  Monitor BP at home and notify MD if sBP >160 or <90. Also notify MD if dBP >100 or <60.  Limit caffeine intake.  Seek immediate medical treatment for chest pain, SOB, LE edema, severe headache, blurred vision, dizziness, slurred speech, any new or worsening symptoms.    3. Prediabetes  Continue metformin as prescribed.  ADA diet and exercise recommended.  Weight loss (at least 10%) recommended.  Seek immediate medical treatment for blood sugar <70 or >300 and for symptoms including diaphoresis, chest pain, SOB, tremors, syncope or any other worsening symptoms.    4. Depressive episode  - Ambulatory referral/consult to Psychiatry; Future  Symptoms likely due to prolonged grief reaction and current psychosocial stressors.  Denies SI/HI, AH/VH.  Recommend relaxation techniques and coping strategies (i.e. essential oils, deep breathing, exercise, etc).  Seek immediate medical treatment for SOB, persistent panic attack, chest pain, suicidal thoughts or hallucinations.    5. Frequent UTI  - Urinalysis, Reflex to Urine Culture; Future    6. Vitamin D deficiency  - Vitamin D; Future    Vitamin D level ordered and pending.   Patient will be treated appropriately based on results of testing.  Increase Vitamin D rich food in diet.  Despite vitamin D deficiency patient should continue to wear sunscreen prior to any prolonged sun exposure.    7. Encounter for screening for osteoporosis  - DXA Bone Density Axial Skeleton 1 or more sites; Future    8. Personal history of colonic polyps  Last colonoscopy 09/2022, repeat due in  2027.    Follow up in about 6 weeks (around 10/30/2023) for Depression.

## 2023-09-21 ENCOUNTER — PATIENT OUTREACH (OUTPATIENT)
Dept: ADMINISTRATIVE | Facility: HOSPITAL | Age: 48
End: 2023-09-21
Payer: COMMERCIAL

## 2023-09-21 ENCOUNTER — PATIENT MESSAGE (OUTPATIENT)
Dept: ADMINISTRATIVE | Facility: HOSPITAL | Age: 48
End: 2023-09-21
Payer: COMMERCIAL

## 2023-09-21 NOTE — PROGRESS NOTES
Population Health Chart Review & Patient Outreach Details:     Reason for Outreach Encounter:     []  Non-Compliant Report   []  Payor Report ([employer], PHN, BCBS, MSSP, MCIP, UHC, etc.)   [x]  Cam     Updates Requested / Reviewed:     []  Care Everywhere    []     []  External Sources ([facility], [facility], DIS, etc.)   []  Care Team Updated    Patient Outreach Method:    []  Telephone Outreach Completed   [] Successful   [] Left Voicemail   [] Unable to Contact (wrong number, no voicemail)  [x]  [facility] Outreach Sent  []  Letter Outreach Mailed  []  Fax Sent for External Records  []  External Records Upload    Health Maintenance Topics Addressed and Outreach Outcomes / Actions Taken:        []      Breast Cancer Screening []  Mammo Scheduled      []  External Records Requested     []  Added Reminder to Complete to Upcoming Primary Care Appt Notes     []  Patient Declined     []  Patient Will Call Back to Schedule     []  Patient Will Schedule with External Provider / Order Routed if Applicable             []       Cervical Cancer Screening []  Pap Scheduled      []  External Records Requested     []  Added Reminder to Complete to Upcoming Primary Care Appt Notes     []  Patient Declined     []  Patient Will Call Back to Schedule     []  Patient Will Schedule with External Provider               []          Colorectal Cancer Screening []  Colonoscopy Case Request or Referral Placed     []  External Records Requested     []  Added Reminder to Complete to Upcoming Primary Care Appt Notes     []  Patient Declined     []  Patient Will Call Back to Schedule     []  Patient Will Schedule with External Provider     []  Fit Kit Mailed (add the SmartPhrase under additional notes)     []  Reminded Patient to Complete Home Test             [x]      Diabetic Eye Exam [x]  Eye Camera Scheduled or Optometry Referral Placed     []  External Records Requested     []  Added Reminder to Complete to Upcoming Primary  Care Appt Notes     []  Patient Declined     []  Patient Will Call Back to Schedule     []  Patient Will Schedule with External Provider             []      Blood Pressure Control []  Primary Care Follow Up Visit Scheduled     []  Remote Blood Pressure Reading Captured     []  Added Reminder to Complete to Upcoming Primary Care Appt Notes     []  Patient Declined     []  Patient Will Call Back / Patient Will Send Portal Message with Reading     []  Patient Will Call Back to Schedule Provider Visit             [x]       HbA1c & Other Labs []  Lab Appt Ordered     []  Primary Care Follow Up Visit Scheduled      []  Reminded Patient to Complete Home Test     []  Added Reminder to Complete to Upcoming Primary Care Appt Notes     []  Patient Declined     []  Patient Will Call Back to Schedule     []  Patient Will Schedule with External Provider / Order Routed if Applicable           []    Schedule Primary Care Appt []  Primary Care Appt Scheduled     []  Patient Declined     []  Patient Will Call Back to Schedule     []  Pt Established with External Provider & Updated Care Team             []      Medication Adherence []  Primary Care Appointment Scheduled     []  Added Reminder to Upcoming Primary Care Appt Notes     []  Patient Reminded to  Prescription     []  Patient Declined, Provider Notified if Needed     []  Sent Provider Message to Review and/or Add Exclusion to Problem List             []      Osteoporosis Screening []  DXA Appointment Scheduled     []  External Records Requested     []  Added Reminder to Complete to Upcoming Primary Care Appt Notes     []  Patient Declined     []  Patient Will Call Back to Schedule     []  Patient Will Schedule with External Provider / Order Routed if Applicable     Additional Care Coordinator Notes:     Patient notified that labs have been ordered and can be completed before her visit on Oct 30th  Notified patient that a referral can be sent for DM eye exam. Waiting  for response form patient to see where referral should be sent.

## 2023-09-28 ENCOUNTER — OFFICE VISIT (OUTPATIENT)
Dept: UROGYNECOLOGY | Facility: CLINIC | Age: 48
End: 2023-09-28
Payer: COMMERCIAL

## 2023-09-28 VITALS
SYSTOLIC BLOOD PRESSURE: 138 MMHG | HEART RATE: 61 BPM | BODY MASS INDEX: 35.36 KG/M2 | DIASTOLIC BLOOD PRESSURE: 84 MMHG | HEIGHT: 67 IN | WEIGHT: 225.31 LBS

## 2023-09-28 DIAGNOSIS — N95.8 GENITOURINARY SYNDROME OF MENOPAUSE: ICD-10-CM

## 2023-09-28 DIAGNOSIS — N39.0 RECURRENT UTI: Primary | ICD-10-CM

## 2023-09-28 PROCEDURE — 99205 PR OFFICE/OUTPT VISIT, NEW, LEVL V, 60-74 MIN: ICD-10-PCS | Mod: 25,S$GLB,, | Performed by: OBSTETRICS & GYNECOLOGY

## 2023-09-28 PROCEDURE — 3075F PR MOST RECENT SYSTOLIC BLOOD PRESS GE 130-139MM HG: ICD-10-PCS | Mod: CPTII,S$GLB,, | Performed by: OBSTETRICS & GYNECOLOGY

## 2023-09-28 PROCEDURE — 51701 INSERT BLADDER CATHETER: CPT | Mod: S$GLB,,, | Performed by: OBSTETRICS & GYNECOLOGY

## 2023-09-28 PROCEDURE — 51701 PR INSERTION OF NON-INDWELLING BLADDER CATHETERIZATION FOR RESIDUAL UR: ICD-10-PCS | Mod: S$GLB,,, | Performed by: OBSTETRICS & GYNECOLOGY

## 2023-09-28 PROCEDURE — 1159F PR MEDICATION LIST DOCUMENTED IN MEDICAL RECORD: ICD-10-PCS | Mod: CPTII,S$GLB,, | Performed by: OBSTETRICS & GYNECOLOGY

## 2023-09-28 PROCEDURE — 1159F MED LIST DOCD IN RCRD: CPT | Mod: CPTII,S$GLB,, | Performed by: OBSTETRICS & GYNECOLOGY

## 2023-09-28 PROCEDURE — 3079F PR MOST RECENT DIASTOLIC BLOOD PRESSURE 80-89 MM HG: ICD-10-PCS | Mod: CPTII,S$GLB,, | Performed by: OBSTETRICS & GYNECOLOGY

## 2023-09-28 PROCEDURE — 99205 OFFICE O/P NEW HI 60 MIN: CPT | Mod: 25,S$GLB,, | Performed by: OBSTETRICS & GYNECOLOGY

## 2023-09-28 PROCEDURE — 3075F SYST BP GE 130 - 139MM HG: CPT | Mod: CPTII,S$GLB,, | Performed by: OBSTETRICS & GYNECOLOGY

## 2023-09-28 PROCEDURE — 87088 URINE BACTERIA CULTURE: CPT | Performed by: OBSTETRICS & GYNECOLOGY

## 2023-09-28 PROCEDURE — 3008F BODY MASS INDEX DOCD: CPT | Mod: CPTII,S$GLB,, | Performed by: OBSTETRICS & GYNECOLOGY

## 2023-09-28 PROCEDURE — 3079F DIAST BP 80-89 MM HG: CPT | Mod: CPTII,S$GLB,, | Performed by: OBSTETRICS & GYNECOLOGY

## 2023-09-28 PROCEDURE — 3008F PR BODY MASS INDEX (BMI) DOCUMENTED: ICD-10-PCS | Mod: CPTII,S$GLB,, | Performed by: OBSTETRICS & GYNECOLOGY

## 2023-09-28 RX ORDER — PHENAZOPYRIDINE HYDROCHLORIDE 200 MG/1
200 TABLET, FILM COATED ORAL 3 TIMES DAILY PRN
Qty: 30 TABLET | Refills: 3 | Status: SHIPPED | OUTPATIENT
Start: 2023-09-28 | End: 2023-10-08

## 2023-09-28 RX ORDER — ESTRADIOL 0.1 MG/G
CREAM VAGINAL
Qty: 42.5 G | Refills: 10 | Status: SHIPPED | OUTPATIENT
Start: 2023-09-28

## 2023-09-28 NOTE — PROGRESS NOTES
PELVIC MEDICINE AND RECONSTRUCTIVE SURGERY CONSULT NOTE    CHIEF COMPLAINT:  Consultation requested by Dr. Fonseca regarding recurrent UTIs    HISTORY OF PRESENT ILLNESS:   Karen Tapia is a 48 y.o. female  Patient reports first noticing symptoms in July 2023. She has sx of urgency and hesitancy.  She states she started taking a [employer] brand OTC remedy for UTI symptoms.  They are orange and red tablets.  She states she also gets UTIs when she holds her urine for a prolonged period of time.      She usually utilizes urgent cares for her UTI symptoms  Of note, she has DM2 and takes Metformin.  Her last A1C was 6.3 on 12/21/22.    Voids during the day: q3-4  Voids at nighttime: 0  Leakage of urine with coughing or exercise: No  -previous surgery? No  Leakage of urine with urgency: No  Pad for leakage of urine:no   -how often do you change your pad? N/A  Sensation of incomplete emptying: No  Splinting to void/BM: No  History of kidney stones No  Hematuria Yes - she states she has had that for a long time.  Always clean catch, no catheterized specimens.  > 3 UTIs in 12 months  Yes - E coli, GBBS, MRSA -all documented  - symptoms with UTI? Pressure, Uncomfortable after urinating  Stream: Normal    Glasses/ounces of water in 1 day: 7 bottles  Cups of coffee/tea/soda in 1 day: 2 soda  Bowel movements in 1 week: 4  Constipation/straining: No  Fecal incontinence: No  Fecal urgency: No  Fecal smearing: No    Patient  denies symptoms of a vaginal bulge.  Size of the bulge: N/A  Bulge becoming progressively worse over time: not applicable  Bulge limits physical activity: not applicable    Tried a pessary: No  Tried Kegels: No  Prior Surgery for prolapse: No  Prior Surgery for incontinence: No    Sexually active: Yes  Pain with sex: no  Vaginal dryness: No  Loss of urine or stool with sexual activity:no    She reports vaginal bleeding, vaginal discharge, dysuria, hematuria,   Patient also denies abdominal or pelvic pain.    Patient  reports back injury or back pain  Patient reports lower extremity numbness, tingling    UTI Hx:  2023  >100K E coli   (Pansensitive) --> Cipro 500 mg PO BID x 7 days  2023  >100K S.agalactiae (GBS) --> Macrobid 100 mg PO BID x 1 day, then changed to Amoxil 875 mg PO BID x 10 days  2023  25-50K MRSA (R' Oxacillin) --> Bactrim DS PO BID x 5 days    Gyn Hx:  Patient reports h/o Normal paps; denies h/o Fibroids, denies h/o Endometriosis, denies h/o Ovarian Cysts, denies h/o abnormal paps, denies h/o LEEP/Cryo  Menopause age: 48  No LMP recorded. Patient is premenopausal.   V3  Obstetric complications? yes  Vaginal births? not applicable  -use of forceps or vacuum? no    OB History    Para Term  AB Living   3 2 2 0 0 2   SAB IAB Ectopic Multiple Live Births   0 0 0 0 3      # Outcome Date GA Lbr Karthik/2nd Weight Sex Delivery Anes PTL Lv   3 Term     M CS-Unspec   HÉCTOR   2     2.767 kg (6 lb 1.6 oz) F CS-Unspec   HÉCTOR   1 Term    3.243 kg (7 lb 2.4 oz) M CS-Unspec   DEC      Review of patient's allergies indicates:   Allergen Reactions    Codeine Rash        Current Outpatient Medications:     metFORMIN (GLUCOPHAGE-XR) 500 MG ER 24hr tablet, Take 1 tablet (500 mg total) by mouth once daily., Disp: 90 tablet, Rfl: 3    ergocalciferol (ERGOCALCIFEROL) 50,000 unit Cap, Take 50,000 Units by mouth every 7 days., Disp: , Rfl:     estradioL (ESTRACE) 0.01 % (0.1 mg/gram) vaginal cream, Use a pea sized amount to the vagina once a night for 14 nights when initiating the medication, then 2-3 times per week.  DO NOT use the applicator., Disp: 42.5 g, Rfl: 10    LOW-OGESTREL, 28, 0.3-30 mg-mcg per tablet, Take 1 tablet by mouth., Disp: , Rfl:     multivitamin with minerals tablet, Take 1 tablet by mouth once daily. Iron supplement PRN, Disp: , Rfl:     phenazopyridine (PYRIDIUM) 200 MG tablet, Take 1 tablet (200 mg total) by mouth 3 (three) times daily as needed for Pain., Disp: 30  "tablet, Rfl: 3    polyethylene glycol (GLYCOLAX) 17 gram/dose powder, Take 17 g by mouth once daily. (Patient not taking: Reported on 9/15/2023), Disp: 1530 g, Rfl: 2    Past Medical History:   Diagnosis Date    Anemia     Fracture of triquetrum 2020    Obesity 2022    Osteoarthritis 2022    Personal history of colonic polyps     Plantar fasciitis of right foot 2022    Prediabetes 2022    Primary hypertension 2022    Urinary tract infection     Vitamin D deficiency 2022     Past Surgical History:   Procedure Laterality Date     SECTION      , ,     COLONOSCOPY  2022       Family History   Problem Relation Age of Onset    Hypertension Father     Cancer Mother     Alzheimer's disease Mother     Diabetes Mother     Hypertension Mother     Pancreatic cancer Mother     Cancer Maternal Grandmother         Social History     Tobacco Use    Smoking status: Never    Smokeless tobacco: Never   Substance Use Topics    Alcohol use: Yes    Drug use: Never        Review of Systems   Psychological ROS: negative  Eyes: Blurring of vision  ENT ROS: Negative  Neuro ROS: Negative  Respiratory ROS: Negative  Gastrointestinal ROS: Negative  Cardiovascular ROS: Chest pain  Genitourinary ROS: Blood in the urine and Burning or pain with urination  Integumentary ROS: Sores  Musculoskeletal ROS: Fracture      Physical Exam:   /84   Pulse 61   Ht 5' 7" (1.702 m)   Wt 102.2 kg (225 lb 5 oz)   BMI 35.29 kg/m²   General: No acute distress   Skin: no lesions  Musculoskeletal: normal lower extremity strength  Sensation to light touch in the lower extremities is normal   Extremities: well perfused with normal pulses in the distal extremities, no peripheral edema noted  Abdominal exam: soft non tender, no palpable masses, no scar  External genitalia: normal female genitalia, no lesions, normal female hair distribution, no clitoral enlargement, nontender, no " scars  Sensation S2-S4 is present  The perineal reflexes are present  Exam Chaperoned by: Gin Mcginnis LPN  Vagina/cervix: atrophic vagina, no discharge, exudate, lesion, or erythema. Blood noted at the os.  Bimanual exam: Anteverted and Mobile, no palpable masses, non-tender exam  Urethra: no prolapse, caruncle, absent  Rectal: deferred  Stress test: negative  Urethral hypermobility: absent  Urine catheterized today for micro and culture    POPQ (Date 2023): No significant prolapse noted    2023: Pap neg/HPV neg    ASSESSMENT:  Karen Tapia is a 48 y.o.   V0  1. Recurrent UTI    2. Genitourinary syndrome of menopause          PLAN:  The pathophysiology of the above condition has been discussed with the patient who expressed understanding.     Recurrent UTI - The patient has a history of recurrent UTI. The pathophysiology of the above conditions have been discussed with the patient. We reviewed the risks factors for recurrent UTI ( female, menopause, bacterial colonization) and the importance of culturing future symptomatic episodes to confirm the diagnosis. We discussed management options like cranberry supplements, vaginal estrogen, D-mannose, increased water consumption, prophylactic suppression and self treatment. At this time we will send the urine for culture and proceed with recurrent UTI regimen, standing urine culture, pyridium, probiotic, vaginal estrogen.    Urine also sent for micro.  Patient states she usually has blood in her urine but never had anything but a clean catch.  She has blood noted at the os today.  She states she used to be on Depo-provera and her periods had stopped for a while, she was supposed to be on an OCP, but she is not taking it.  She states she still occasionally has spotting. She states she recently had an endometrial biopsy (she states it was quite painful) and it was normal.    Genitourinary syndrome of menopause/Vaginal atrophy - due to a  hypoestrogenic state in the vagina. Though it is most common in postmenopausal women (up to 50-70%  of postmenopausal women being symptomatic at least to some degree), it can affect up to 15% of premenopausal women was well.  GSM is a spectrum of changes such as vaginal dryness, dyspareunia, irritation, burning, itching, dysuria, urgency, stress/urgency incontinence, recurrent UTIs, urethral prolapse, decreased elasticity, and ischemia of the vesical trigone. If unable to use hormonal treatments, patient can use vaginal moisturizers such as: Replens, Revaree, or Womaness Davily V Soothe. However, if there is no contraindication to use, vaginal estrogen is the best treatment for GSM. Encouraged use of vaginal estrogen as it will restore some elasticity to the vagina and has been shown to decrease the rate of recurrent UTIs, decrease vaginal pH, and increase vaginal lactobacillus.  A pea-sized amount to the vagina every night for 14 days, then 2-3 times per week.  Handout given on low dose vaginal estrogen.    At the time the patient desires to proceed:  D-Mannose  Cranberry tablets  Probiotic  Estrace  Standing urine culture    Handouts provided on above diagnosis    Follow up 6 weeks    Orders Placed This Encounter    CULTURE, URINE    Urine culture    Urinalysis    estradioL (ESTRACE) 0.01 % (0.1 mg/gram) vaginal cream    phenazopyridine (PYRIDIUM) 200 MG tablet       SHELBY Fragoso MD  Pelvic Medicine and Reconstructive Surgery  Urogynecology  [facility]    65 minutes was spent face to face with patient >50% of the time was spent in counseling and coordination of care.

## 2023-09-30 LAB — BACTERIA UR CULT: NO GROWTH

## 2023-10-30 ENCOUNTER — OFFICE VISIT (OUTPATIENT)
Dept: PRIMARY CARE CLINIC | Facility: CLINIC | Age: 48
End: 2023-10-30
Payer: COMMERCIAL

## 2023-10-30 VITALS
DIASTOLIC BLOOD PRESSURE: 83 MMHG | TEMPERATURE: 98 F | OXYGEN SATURATION: 99 % | BODY MASS INDEX: 35.03 KG/M2 | SYSTOLIC BLOOD PRESSURE: 153 MMHG | HEIGHT: 67 IN | WEIGHT: 223.19 LBS | HEART RATE: 61 BPM | RESPIRATION RATE: 20 BRPM

## 2023-10-30 DIAGNOSIS — Z86.2 HISTORY OF ANEMIA: ICD-10-CM

## 2023-10-30 DIAGNOSIS — F32.A DEPRESSIVE EPISODE: Primary | ICD-10-CM

## 2023-10-30 DIAGNOSIS — I10 PRIMARY HYPERTENSION: ICD-10-CM

## 2023-10-30 DIAGNOSIS — Z00.00 ENCOUNTER FOR PREVENTATIVE ADULT HEALTH CARE EXAMINATION: ICD-10-CM

## 2023-10-30 DIAGNOSIS — Z00.00 PREVENTATIVE HEALTH CARE: ICD-10-CM

## 2023-10-30 DIAGNOSIS — E55.9 VITAMIN D DEFICIENCY: ICD-10-CM

## 2023-10-30 DIAGNOSIS — R73.03 PREDIABETES: ICD-10-CM

## 2023-10-30 LAB
ALBUMIN SERPL-MCNC: 4.2 G/DL (ref 3.5–5)
ALBUMIN/GLOB SERPL: 1.4 RATIO (ref 1.1–2)
ALP SERPL-CCNC: 58 UNIT/L (ref 40–150)
ALT SERPL-CCNC: 17 UNIT/L (ref 0–55)
AST SERPL-CCNC: 15 UNIT/L (ref 5–34)
BASOPHILS # BLD AUTO: 0.04 X10(3)/MCL
BASOPHILS NFR BLD AUTO: 0.7 %
BILIRUB SERPL-MCNC: 0.4 MG/DL
BUN SERPL-MCNC: 8.4 MG/DL (ref 9.8–20.1)
CALCIUM SERPL-MCNC: 9.4 MG/DL (ref 8.4–10.2)
CHLORIDE SERPL-SCNC: 108 MMOL/L (ref 98–107)
CHOLEST SERPL-MCNC: 188 MG/DL
CHOLEST/HDLC SERPL: 4 {RATIO} (ref 0–5)
CO2 SERPL-SCNC: 29 MMOL/L (ref 22–29)
CREAT SERPL-MCNC: 0.86 MG/DL (ref 0.55–1.02)
DEPRECATED CALCIDIOL+CALCIFEROL SERPL-MC: 45.8 NG/ML (ref 30–80)
EOSINOPHIL # BLD AUTO: 0.06 X10(3)/MCL (ref 0–0.9)
EOSINOPHIL NFR BLD AUTO: 1.1 %
ERYTHROCYTE [DISTWIDTH] IN BLOOD BY AUTOMATED COUNT: 13.2 % (ref 11.5–17)
EST. AVERAGE GLUCOSE BLD GHB EST-MCNC: 116.9 MG/DL
GFR SERPLBLD CREATININE-BSD FMLA CKD-EPI: >60 MLS/MIN/1.73/M2
GLOBULIN SER-MCNC: 2.9 GM/DL (ref 2.4–3.5)
GLUCOSE SERPL-MCNC: 107 MG/DL (ref 74–100)
HBA1C MFR BLD: 5.7 %
HCT VFR BLD AUTO: 36.4 % (ref 37–47)
HDLC SERPL-MCNC: 53 MG/DL (ref 35–60)
HGB BLD-MCNC: 11.5 G/DL (ref 12–16)
IMM GRANULOCYTES # BLD AUTO: 0.03 X10(3)/MCL (ref 0–0.04)
IMM GRANULOCYTES NFR BLD AUTO: 0.6 %
LDLC SERPL CALC-MCNC: 117 MG/DL (ref 50–140)
LYMPHOCYTES # BLD AUTO: 1.84 X10(3)/MCL (ref 0.6–4.6)
LYMPHOCYTES NFR BLD AUTO: 34.3 %
MCH RBC QN AUTO: 27.6 PG (ref 27–31)
MCHC RBC AUTO-ENTMCNC: 31.6 G/DL (ref 33–36)
MCV RBC AUTO: 87.5 FL (ref 80–94)
MONOCYTES # BLD AUTO: 0.32 X10(3)/MCL (ref 0.1–1.3)
MONOCYTES NFR BLD AUTO: 6 %
NEUTROPHILS # BLD AUTO: 3.07 X10(3)/MCL (ref 2.1–9.2)
NEUTROPHILS NFR BLD AUTO: 57.3 %
NRBC BLD AUTO-RTO: 0 %
PLATELET # BLD AUTO: 230 X10(3)/MCL (ref 130–400)
PMV BLD AUTO: 11.2 FL (ref 7.4–10.4)
POTASSIUM SERPL-SCNC: 4 MMOL/L (ref 3.5–5.1)
PROT SERPL-MCNC: 7.1 GM/DL (ref 6.4–8.3)
RBC # BLD AUTO: 4.16 X10(6)/MCL (ref 4.2–5.4)
SODIUM SERPL-SCNC: 143 MMOL/L (ref 136–145)
TRIGL SERPL-MCNC: 91 MG/DL (ref 37–140)
TSH SERPL-ACNC: 1.61 UIU/ML (ref 0.35–4.94)
VLDLC SERPL CALC-MCNC: 18 MG/DL
WBC # SPEC AUTO: 5.36 X10(3)/MCL (ref 4.5–11.5)

## 2023-10-30 PROCEDURE — 1160F RVW MEDS BY RX/DR IN RCRD: CPT | Mod: CPTII,,, | Performed by: FAMILY MEDICINE

## 2023-10-30 PROCEDURE — 3079F PR MOST RECENT DIASTOLIC BLOOD PRESSURE 80-89 MM HG: ICD-10-PCS | Mod: CPTII,,, | Performed by: FAMILY MEDICINE

## 2023-10-30 PROCEDURE — 99213 OFFICE O/P EST LOW 20 MIN: CPT | Mod: ,,, | Performed by: FAMILY MEDICINE

## 2023-10-30 PROCEDURE — 1159F MED LIST DOCD IN RCRD: CPT | Mod: CPTII,,, | Performed by: FAMILY MEDICINE

## 2023-10-30 PROCEDURE — 36415 COLL VENOUS BLD VENIPUNCTURE: CPT | Performed by: FAMILY MEDICINE

## 2023-10-30 PROCEDURE — 83036 HEMOGLOBIN GLYCOSYLATED A1C: CPT | Performed by: FAMILY MEDICINE

## 2023-10-30 PROCEDURE — 1160F PR REVIEW ALL MEDS BY PRESCRIBER/CLIN PHARMACIST DOCUMENTED: ICD-10-PCS | Mod: CPTII,,, | Performed by: FAMILY MEDICINE

## 2023-10-30 PROCEDURE — 84443 ASSAY THYROID STIM HORMONE: CPT | Performed by: FAMILY MEDICINE

## 2023-10-30 PROCEDURE — 3079F DIAST BP 80-89 MM HG: CPT | Mod: CPTII,,, | Performed by: FAMILY MEDICINE

## 2023-10-30 PROCEDURE — 80053 COMPREHEN METABOLIC PANEL: CPT | Performed by: FAMILY MEDICINE

## 2023-10-30 PROCEDURE — 3008F PR BODY MASS INDEX (BMI) DOCUMENTED: ICD-10-PCS | Mod: CPTII,,, | Performed by: FAMILY MEDICINE

## 2023-10-30 PROCEDURE — 3077F SYST BP >= 140 MM HG: CPT | Mod: CPTII,,, | Performed by: FAMILY MEDICINE

## 2023-10-30 PROCEDURE — 3008F BODY MASS INDEX DOCD: CPT | Mod: CPTII,,, | Performed by: FAMILY MEDICINE

## 2023-10-30 PROCEDURE — 99213 PR OFFICE/OUTPT VISIT, EST, LEVL III, 20-29 MIN: ICD-10-PCS | Mod: ,,, | Performed by: FAMILY MEDICINE

## 2023-10-30 PROCEDURE — 85025 COMPLETE CBC W/AUTO DIFF WBC: CPT | Performed by: FAMILY MEDICINE

## 2023-10-30 PROCEDURE — 80061 LIPID PANEL: CPT | Performed by: FAMILY MEDICINE

## 2023-10-30 PROCEDURE — 3077F PR MOST RECENT SYSTOLIC BLOOD PRESSURE >= 140 MM HG: ICD-10-PCS | Mod: CPTII,,, | Performed by: FAMILY MEDICINE

## 2023-10-30 PROCEDURE — 1159F PR MEDICATION LIST DOCUMENTED IN MEDICAL RECORD: ICD-10-PCS | Mod: CPTII,,, | Performed by: FAMILY MEDICINE

## 2023-10-30 PROCEDURE — 82306 VITAMIN D 25 HYDROXY: CPT | Performed by: FAMILY MEDICINE

## 2023-10-30 NOTE — PROGRESS NOTES
Subjective:      Patient ID: Karen Tapia is a 48 y.o. female.    Chief Complaint: Depression (6 week follow up. She needs a referral to a different psychiatrist due to them not taking her insurance plan. )    Disclaimer:  This note is prepared using voice recognition software and as such is likely to have errors despite attempts at proofreading. Please contact me for questions.        48-year-old female who presents for follow-up of depressive episode.  The patient denies any recent depressive symptoms, denies suicidal ideation, and denies any recent panic attacks.  She states that she has been irritable and has had frustration due to incarceration of a friend and job stressors however she is been coping.  She was referred to Psychiatry however was unable to schedule an appointment due to insurance.  She is amenable to a new referral being placed for psychotherapy.  The patient states that she is hesitant to start any antidepressants.  She denies loss of interest.    Past Medical History:   Diagnosis Date    Anemia     Fracture of triquetrum 06/17/2020    Obesity 12/21/2022    Osteoarthritis 12/21/2022    Personal history of colonic polyps     Plantar fasciitis of right foot 12/21/2022    Prediabetes 12/21/2022    Primary hypertension 12/21/2022    Urinary tract infection     Vitamin D deficiency 12/21/2022        Current Outpatient Medications on File Prior to Visit   Medication Sig Dispense Refill    ergocalciferol (ERGOCALCIFEROL) 50,000 unit Cap Take 50,000 Units by mouth every 7 days.      estradioL (ESTRACE) 0.01 % (0.1 mg/gram) vaginal cream Use a pea sized amount to the vagina once a night for 14 nights when initiating the medication, then 2-3 times per week.  DO NOT use the applicator. 42.5 g 10    metFORMIN (GLUCOPHAGE-XR) 500 MG ER 24hr tablet Take 1 tablet (500 mg total) by mouth once daily. 90 tablet 3    multivitamin with minerals tablet Take 1 tablet by mouth once daily. Iron supplement PRN      NON  "FORMULARY MEDICATION Take 1 capsule by mouth Daily. Femdophilus      NON FORMULARY MEDICATION Take 2 tablets by mouth once daily. UriCalm      LOW-OGESTREL, 28, 0.3-30 mg-mcg per tablet Take 1 tablet by mouth.      polyethylene glycol (GLYCOLAX) 17 gram/dose powder Take 17 g by mouth once daily. (Patient not taking: Reported on 9/15/2023) 1530 g 2     No current facility-administered medications on file prior to visit.        Review of patient's allergies indicates:   Allergen Reactions    Codeine Rash       Review of Systems   Constitutional:  Negative for chills, diaphoresis and fever.   Eyes:  Negative for blurred vision and double vision.   Respiratory:  Negative for cough and shortness of breath.    Cardiovascular:  Negative for chest pain and leg swelling.   Gastrointestinal:  Negative for abdominal pain, diarrhea, nausea and vomiting.   Skin:  Negative for rash.   Neurological:  Negative for dizziness, tremors and headaches.   Psychiatric/Behavioral:  Negative for suicidal ideas. The patient is not nervous/anxious.        Objective:     Vitals:    10/30/23 0944   BP: (!) 153/83   Pulse: 61   Resp: 20   Temp: 97.8 °F (36.6 °C)   SpO2: 99%   Weight: 101.2 kg (223 lb 3.2 oz)   Height: 5' 7" (1.702 m)     Physical Exam  Constitutional:       General: She is not in acute distress.     Appearance: Normal appearance. She is not ill-appearing, toxic-appearing or diaphoretic.   HENT:      Head: Normocephalic and atraumatic.      Right Ear: External ear normal.      Left Ear: External ear normal.      Nose: Nose normal.   Eyes:      Extraocular Movements: Extraocular movements intact.      Conjunctiva/sclera: Conjunctivae normal.   Pulmonary:      Effort: Pulmonary effort is normal. No respiratory distress.   Musculoskeletal:      Cervical back: Normal range of motion.   Skin:     Coloration: Skin is not pale.   Neurological:      General: No focal deficit present.      Mental Status: She is alert and oriented to " person, place, and time. Mental status is at baseline.   Psychiatric:         Mood and Affect: Mood normal.         Behavior: Behavior normal.         Thought Content: Thought content normal.         Judgment: Judgment normal.         Assessment:     1. Depressive episode    2. Primary hypertension    3. History of anemia    4. Preventative health care      Plan:     1. Depressive episode  - TSH; Future  Stable   Referral to psych resent.  Denies SI/HI, AH/VH.  Recommend relaxation techniques and coping strategies (i.e. essential oils, deep breathing, exercise, etc).  Seek immediate medical treatment for SOB, persistent panic attack, chest pain, suicidal thoughts or hallucinations.    2. Primary hypertension  - Lipid Panel; Future  - CBC Auto Differential; Future  - TSH; Future  BP not at goal  Admits to stressful phone conversation prior to visit contributing to elevated blood pressure.  Low sodium diet and exercise recommended  Monitor BP at home and notify MD if sBP >160 or <90. Also notify MD if dBP >100 or <60.  Limit caffeine intake.  Seek immediate medical treatment for chest pain, SOB, LE edema, severe headache, blurred vision, dizziness, slurred speech, any new or worsening symptoms.    3. History of anemia  - CBC Auto Differential; Future    4. Preventative health care  - Lipid Panel; Future       Follow up in about 3 months (around 1/30/2024) for Depression and anxiety.  Karen Tapia was given education on their disease process and medications.

## 2023-11-06 ENCOUNTER — OFFICE VISIT (OUTPATIENT)
Dept: NEUROLOGY | Facility: CLINIC | Age: 48
End: 2023-11-06
Payer: COMMERCIAL

## 2023-11-06 VITALS
DIASTOLIC BLOOD PRESSURE: 96 MMHG | HEIGHT: 67 IN | SYSTOLIC BLOOD PRESSURE: 141 MMHG | HEART RATE: 71 BPM | BODY MASS INDEX: 35.16 KG/M2 | WEIGHT: 224 LBS

## 2023-11-06 DIAGNOSIS — R41.3 MEMORY DEFICITS: ICD-10-CM

## 2023-11-06 DIAGNOSIS — R41.3 OTHER AMNESIA: Primary | ICD-10-CM

## 2023-11-06 PROCEDURE — 99204 OFFICE O/P NEW MOD 45 MIN: CPT | Mod: S$GLB,,, | Performed by: PSYCHIATRY & NEUROLOGY

## 2023-11-06 PROCEDURE — 3044F PR MOST RECENT HEMOGLOBIN A1C LEVEL <7.0%: ICD-10-PCS | Mod: CPTII,S$GLB,, | Performed by: PSYCHIATRY & NEUROLOGY

## 2023-11-06 PROCEDURE — 1159F MED LIST DOCD IN RCRD: CPT | Mod: CPTII,S$GLB,, | Performed by: PSYCHIATRY & NEUROLOGY

## 2023-11-06 PROCEDURE — 1159F PR MEDICATION LIST DOCUMENTED IN MEDICAL RECORD: ICD-10-PCS | Mod: CPTII,S$GLB,, | Performed by: PSYCHIATRY & NEUROLOGY

## 2023-11-06 PROCEDURE — 3077F PR MOST RECENT SYSTOLIC BLOOD PRESSURE >= 140 MM HG: ICD-10-PCS | Mod: CPTII,S$GLB,, | Performed by: PSYCHIATRY & NEUROLOGY

## 2023-11-06 PROCEDURE — 99999 PR PBB SHADOW E&M-EST. PATIENT-LVL IV: ICD-10-PCS | Mod: PBBFAC,,, | Performed by: PSYCHIATRY & NEUROLOGY

## 2023-11-06 PROCEDURE — 3008F PR BODY MASS INDEX (BMI) DOCUMENTED: ICD-10-PCS | Mod: CPTII,S$GLB,, | Performed by: PSYCHIATRY & NEUROLOGY

## 2023-11-06 PROCEDURE — 3080F DIAST BP >= 90 MM HG: CPT | Mod: CPTII,S$GLB,, | Performed by: PSYCHIATRY & NEUROLOGY

## 2023-11-06 PROCEDURE — 99999 PR PBB SHADOW E&M-EST. PATIENT-LVL IV: CPT | Mod: PBBFAC,,, | Performed by: PSYCHIATRY & NEUROLOGY

## 2023-11-06 PROCEDURE — 3077F SYST BP >= 140 MM HG: CPT | Mod: CPTII,S$GLB,, | Performed by: PSYCHIATRY & NEUROLOGY

## 2023-11-06 PROCEDURE — 3008F BODY MASS INDEX DOCD: CPT | Mod: CPTII,S$GLB,, | Performed by: PSYCHIATRY & NEUROLOGY

## 2023-11-06 PROCEDURE — 3080F PR MOST RECENT DIASTOLIC BLOOD PRESSURE >= 90 MM HG: ICD-10-PCS | Mod: CPTII,S$GLB,, | Performed by: PSYCHIATRY & NEUROLOGY

## 2023-11-06 PROCEDURE — 99204 PR OFFICE/OUTPT VISIT, NEW, LEVL IV, 45-59 MIN: ICD-10-PCS | Mod: S$GLB,,, | Performed by: PSYCHIATRY & NEUROLOGY

## 2023-11-06 PROCEDURE — 3044F HG A1C LEVEL LT 7.0%: CPT | Mod: CPTII,S$GLB,, | Performed by: PSYCHIATRY & NEUROLOGY

## 2023-11-06 NOTE — PROGRESS NOTES
Chief Complaint   Patient presents with    Memory Deficits     NP: Pt referred by Dr. Luda Fonseca for neuro consult to evaluate for memory deficits; Pt states memory changes started about a few years ago difficulty with short term loses objects often, word finding        This is a 48 y.o. female  here for memory changes. Loses objects often, has word finding difficulty. Cannot remember numbers in her head for a few minutes.  Forgets details of conversations.  No trouble with long-term memory.  Denies physical symptoms like focal weakness, numbness, headache.  Mother was diagnosed with Alzheimer's disease in her 60s.  Was  at 67.        Medication List with Changes/Refills   Current Medications    ERGOCALCIFEROL (ERGOCALCIFEROL) 50,000 UNIT CAP    Take 50,000 Units by mouth every 7 days.    ESTRADIOL (ESTRACE) 0.01 % (0.1 MG/GRAM) VAGINAL CREAM    Use a pea sized amount to the vagina once a night for 14 nights when initiating the medication, then 2-3 times per week.  DO NOT use the applicator.    METFORMIN (GLUCOPHAGE-XR) 500 MG ER 24HR TABLET    Take 1 tablet (500 mg total) by mouth once daily.    MULTIVITAMIN WITH MINERALS TABLET    Take 1 tablet by mouth once daily. Iron supplement PRN    NON FORMULARY MEDICATION    Take 1 capsule by mouth Daily. Femdophilus    NON FORMULARY MEDICATION    Take 2 tablets by mouth once daily. UriCalm   Discontinued Medications    LOW-OGESTREL, 28, 0.3-30 MG-MCG PER TABLET    Take 1 tablet by mouth.        Past Surgical History:   Procedure Laterality Date     SECTION      , ,     COLONOSCOPY  2022        Past Medical History:   Diagnosis Date    Anemia     Fracture of triquetrum 2020    Obesity 2022    Osteoarthritis 2022    Personal history of colonic polyps     Plantar fasciitis of right foot 2022    Prediabetes 2022    Primary hypertension 2022    Urinary tract infection     Vitamin D deficiency 2022         Family History   Problem Relation Age of Onset    Hypertension Father     Cancer Mother     Alzheimer's disease Mother     Diabetes Mother     Hypertension Mother     Pancreatic cancer Mother     Cancer Maternal Grandmother         Social History     Socioeconomic History    Marital status: Single   Tobacco Use    Smoking status: Never    Smokeless tobacco: Never   Substance and Sexual Activity    Alcohol use: Yes    Drug use: Never    Sexual activity: Yes     Partners: Male     Birth control/protection: None     Social Determinants of Health     Financial Resource Strain: Low Risk  (6/15/2023)    Overall Financial Resource Strain (CARDIA)     Difficulty of Paying Living Expenses: Not hard at all   Food Insecurity: No Food Insecurity (6/15/2023)    Hunger Vital Sign     Worried About Running Out of Food in the Last Year: Never true     Ran Out of Food in the Last Year: Never true   Transportation Needs: No Transportation Needs (6/15/2023)    PRAPARE - Transportation     Lack of Transportation (Medical): No     Lack of Transportation (Non-Medical): No   Physical Activity: Sufficiently Active (6/15/2023)    Exercise Vital Sign     Days of Exercise per Week: 7 days     Minutes of Exercise per Session: 30 min   Stress: No Stress Concern Present (6/15/2023)    [employer] - Occupational Stress Questionnaire     Feeling of Stress : Only a little   Social Connections: Socially Isolated (6/15/2023)    Social Connection and Isolation Panel [NHANES]     Frequency of Communication with Friends and Family: More than three times a week     Frequency of Social Gatherings with Friends and Family: More than three times a week     Attends Tenriism Services: Never     Active Member of Clubs or Organizations: No     Attends Club or Organization Meetings: Never     Marital Status: Never    Housing Stability: Low Risk  (10/30/2023)    Housing Stability Vital Sign     Unable to Pay for Housing  in the Last Year: No     Number of Places Lived in the Last Year: 1     Unstable Housing in the Last Year: No          Review of Systems  Review of Systems   Constitutional: Negative for appetite change.   HENT: Negative for sinus pressure and sore throat.    Eyes: Negative for visual disturbance.   Respiratory: Negative for cough and shortness of breath.    Cardiovascular: Negative for chest pain.   Gastrointestinal: Negative for diarrhea and nausea.   Endocrine: Negative for cold intolerance and heat intolerance.   Genitourinary: Negative for dysuria.   Musculoskeletal: Negative for arthralgias and myalgias.   Skin: Negative for rash.   Allergic/Immunologic: Negative for immunocompromised state.   Neurological:        See HPI   Hematological: Does not bruise/bleed easily.   Psychiatric/Behavioral: Negative for hallucinations.      General: alert and oriented, no acute distress, no audible wheezes, pulse intact, no edema    Vitals:    11/06/23 1251   BP: (!) 141/96   Pulse: 71        Cognition and Comprehension  Speech and language intact  Follows commands  Speech fluent  Attention intact  Memory for recent events intact from history taking  Affect pleasant  Fund of knowledge adequate    MOCA (26/30)missed 3/5 on STM recall    Cranial nerves  II. Optic: Visual fields full to confrontation both eyes  III, IV, VI. Oculomotor: Intact, Pupils equal, round and reactive to light, no nystagmus  V. Trigeminal: sensation to light touch normal  VII. No facial asymmetry or facial weakness  VIII. Hearing intact to spoken voice  IX/X. Glossopharyngeal/Vagus: Voice normal, palate rises symmetrically  XI. Axillary: Shoulder shrug normal  XII. Hypoglossal: Intact    Muscle Strength and Tone  Normal upper extremity tone  Normal lower extremity tone  Normal upper extremity strength  Normal lower extremity strength        Coordination and Gait  Gait normal       Karen was seen today for memory deficits.    Diagnoses and all orders  for this visit:    Other amnesia  -     MRI Brain W WO Contrast; Future    Memory deficits  -     Ambulatory referral/consult to Neurology  -     Vitamin B12; Future     Check MRI and B12  Consider neuropsych testing

## 2023-11-09 ENCOUNTER — TELEPHONE (OUTPATIENT)
Dept: NEUROLOGY | Facility: CLINIC | Age: 48
End: 2023-11-09
Payer: COMMERCIAL

## 2023-11-09 ENCOUNTER — LAB VISIT (OUTPATIENT)
Dept: LAB | Facility: HOSPITAL | Age: 48
End: 2023-11-09
Attending: PSYCHIATRY & NEUROLOGY
Payer: COMMERCIAL

## 2023-11-09 DIAGNOSIS — R41.3 MEMORY DEFICITS: ICD-10-CM

## 2023-11-09 LAB — VIT B12 SERPL-MCNC: 392 PG/ML (ref 213–816)

## 2023-11-09 PROCEDURE — 82607 VITAMIN B-12: CPT

## 2023-11-09 PROCEDURE — 36415 COLL VENOUS BLD VENIPUNCTURE: CPT

## 2023-11-09 NOTE — TELEPHONE ENCOUNTER
----- Message from Brianna Jacinto MD sent at 11/9/2023  9:27 AM CST -----  Vitamin B12 is relatively low. We would recommend that you supplement with oral vitamin B12 2000 mcg daily. This can be found over the counter.

## 2023-11-09 NOTE — PROGRESS NOTES
Vitamin B12 is relatively low. We would recommend that you supplement with oral vitamin B12 2000 mcg daily. This can be found over the counter.

## 2023-11-14 ENCOUNTER — TELEPHONE (OUTPATIENT)
Dept: NEUROLOGY | Facility: CLINIC | Age: 48
End: 2023-11-14
Payer: COMMERCIAL

## 2023-11-14 ENCOUNTER — OFFICE VISIT (OUTPATIENT)
Dept: UROGYNECOLOGY | Facility: CLINIC | Age: 48
End: 2023-11-14
Payer: COMMERCIAL

## 2023-11-14 VITALS
SYSTOLIC BLOOD PRESSURE: 112 MMHG | DIASTOLIC BLOOD PRESSURE: 78 MMHG | BODY MASS INDEX: 35.16 KG/M2 | HEART RATE: 67 BPM | WEIGHT: 224 LBS | HEIGHT: 67 IN

## 2023-11-14 DIAGNOSIS — N95.8 GENITOURINARY SYNDROME OF MENOPAUSE: ICD-10-CM

## 2023-11-14 DIAGNOSIS — N39.3 STRESS INCONTINENCE OF URINE: ICD-10-CM

## 2023-11-14 DIAGNOSIS — N39.0 RECURRENT UTI: Primary | ICD-10-CM

## 2023-11-14 PROCEDURE — 3008F BODY MASS INDEX DOCD: CPT | Mod: CPTII,S$GLB,, | Performed by: OBSTETRICS & GYNECOLOGY

## 2023-11-14 PROCEDURE — 3008F PR BODY MASS INDEX (BMI) DOCUMENTED: ICD-10-PCS | Mod: CPTII,S$GLB,, | Performed by: OBSTETRICS & GYNECOLOGY

## 2023-11-14 PROCEDURE — 99214 PR OFFICE/OUTPT VISIT, EST, LEVL IV, 30-39 MIN: ICD-10-PCS | Mod: S$GLB,,, | Performed by: OBSTETRICS & GYNECOLOGY

## 2023-11-14 PROCEDURE — 3074F SYST BP LT 130 MM HG: CPT | Mod: CPTII,S$GLB,, | Performed by: OBSTETRICS & GYNECOLOGY

## 2023-11-14 PROCEDURE — 3044F HG A1C LEVEL LT 7.0%: CPT | Mod: CPTII,S$GLB,, | Performed by: OBSTETRICS & GYNECOLOGY

## 2023-11-14 PROCEDURE — 3078F DIAST BP <80 MM HG: CPT | Mod: CPTII,S$GLB,, | Performed by: OBSTETRICS & GYNECOLOGY

## 2023-11-14 PROCEDURE — 3074F PR MOST RECENT SYSTOLIC BLOOD PRESSURE < 130 MM HG: ICD-10-PCS | Mod: CPTII,S$GLB,, | Performed by: OBSTETRICS & GYNECOLOGY

## 2023-11-14 PROCEDURE — 3044F PR MOST RECENT HEMOGLOBIN A1C LEVEL <7.0%: ICD-10-PCS | Mod: CPTII,S$GLB,, | Performed by: OBSTETRICS & GYNECOLOGY

## 2023-11-14 PROCEDURE — 99214 OFFICE O/P EST MOD 30 MIN: CPT | Mod: S$GLB,,, | Performed by: OBSTETRICS & GYNECOLOGY

## 2023-11-14 PROCEDURE — 3078F PR MOST RECENT DIASTOLIC BLOOD PRESSURE < 80 MM HG: ICD-10-PCS | Mod: CPTII,S$GLB,, | Performed by: OBSTETRICS & GYNECOLOGY

## 2023-11-14 NOTE — TELEPHONE ENCOUNTER
----- Message from Brianna Jacinto MD sent at 11/13/2023  4:14 PM CST -----  Please let patient know MRI is normal.

## 2023-11-15 ENCOUNTER — TELEPHONE (OUTPATIENT)
Dept: NEUROLOGY | Facility: CLINIC | Age: 48
End: 2023-11-15
Payer: COMMERCIAL

## 2023-11-24 ENCOUNTER — HOSPITAL ENCOUNTER (EMERGENCY)
Facility: HOSPITAL | Age: 48
Discharge: HOME | End: 2023-11-24
Attending: STUDENT IN AN ORGANIZED HEALTH CARE EDUCATION/TRAINING PROGRAM
Payer: COMMERCIAL

## 2023-11-24 VITALS
WEIGHT: 219.13 LBS | DIASTOLIC BLOOD PRESSURE: 82 MMHG | TEMPERATURE: 98 F | OXYGEN SATURATION: 97 % | SYSTOLIC BLOOD PRESSURE: 136 MMHG | BODY MASS INDEX: 34.39 KG/M2 | RESPIRATION RATE: 18 BRPM | HEART RATE: 76 BPM | HEIGHT: 67 IN

## 2023-11-24 DIAGNOSIS — K04.7 DENTAL INFECTION: Primary | ICD-10-CM

## 2023-11-24 PROCEDURE — 99284 EMERGENCY DEPT VISIT MOD MDM: CPT

## 2023-11-24 RX ORDER — HYDROCODONE BITARTRATE AND ACETAMINOPHEN 5; 325 MG/1; MG/1
1 TABLET ORAL EVERY 6 HOURS PRN
Qty: 12 TABLET | Refills: 0 | Status: SHIPPED | OUTPATIENT
Start: 2023-11-24 | End: 2024-02-07

## 2023-11-24 RX ORDER — CLINDAMYCIN HYDROCHLORIDE 300 MG/1
300 CAPSULE ORAL EVERY 6 HOURS
Qty: 40 CAPSULE | Refills: 0 | Status: SHIPPED | OUTPATIENT
Start: 2023-11-24 | End: 2023-12-04

## 2023-11-24 RX ORDER — LANOLIN ALCOHOL/MO/W.PET/CERES
100 CREAM (GRAM) TOPICAL DAILY
COMMUNITY

## 2023-11-24 NOTE — ED PROVIDER NOTES
"Encounter Date: 2023       History     Chief Complaint   Patient presents with    Dental Pain     States has had tooth pain x 1 month.  Has upcoming appointment on Monday.  Woke yesterday with small "lump in my jaw".  Presents with obvious edema to left lower jaw.       Patient presents to the emergency department due to dental pain facial swelling.  She has a bad tooth in the left lower side.  She has a dentist appointment on Monday.  She is been dealing with this for 2 months but states recently she was noticed some swelling in the left lower side of her face.  The pain is also increased.  No fevers    The history is provided by the patient.     Review of patient's allergies indicates:   Allergen Reactions    Codeine Rash     Past Medical History:   Diagnosis Date    Anemia     Fracture of triquetrum 2020    Obesity 2022    Osteoarthritis 2022    Personal history of colonic polyps     Plantar fasciitis of right foot 2022    Prediabetes 2022    Primary hypertension 2022    Urinary tract infection     Vitamin D deficiency 2022     Past Surgical History:   Procedure Laterality Date     SECTION      , ,     COLONOSCOPY  2022     Family History   Problem Relation Age of Onset    Hypertension Father     Cancer Mother     Alzheimer's disease Mother     Diabetes Mother     Hypertension Mother     Pancreatic cancer Mother     Cancer Maternal Grandmother      Social History     Tobacco Use    Smoking status: Never    Smokeless tobacco: Never   Substance Use Topics    Alcohol use: Yes    Drug use: Never     Review of Systems   Constitutional:  Negative for chills and fever.   HENT:  Positive for dental problem and facial swelling. Negative for congestion and sore throat.    Respiratory:  Negative for cough and shortness of breath.    Cardiovascular:  Negative for chest pain and palpitations.   Gastrointestinal:  Negative for abdominal pain and nausea. "   Genitourinary:  Negative for dysuria and hematuria.   Musculoskeletal:  Negative for arthralgias and myalgias.   Neurological:  Negative for dizziness and weakness.       Physical Exam     Initial Vitals [11/24/23 0402]   BP Pulse Resp Temp SpO2   136/82 76 18 98.2 °F (36.8 °C) 97 %      MAP       --         Physical Exam    Nursing note and vitals reviewed.  Constitutional: She appears well-developed and well-nourished.   HENT:   Head: Normocephalic and atraumatic.   Severe dental yeimi left lower molar no evidence of periapical abscess, there was some associated left lower facial swelling.   Eyes: EOM are normal. Pupils are equal, round, and reactive to light.   Neck: Neck supple.   Normal range of motion.  Cardiovascular:  Normal rate and regular rhythm.           Pulmonary/Chest: Breath sounds normal. No respiratory distress.   Abdominal: Abdomen is soft. There is no abdominal tenderness.   Musculoskeletal:         General: No edema. Normal range of motion.      Cervical back: Normal range of motion and neck supple.     Neurological: She is alert and oriented to person, place, and time.   Skin: Skin is warm and dry.         ED Course   Procedures  Labs Reviewed - No data to display       Imaging Results    None          Medications - No data to display  Medical Decision Making  Will supply patient with antibiotics and give patient was short prescription of pain medication.  She was instructed to follow up with a dentist.    Risk  Prescription drug management.                                   Clinical Impression:  Final diagnoses:  [K04.7] Dental infection (Primary)          ED Disposition Condition    Discharge Stable          ED Prescriptions       Medication Sig Dispense Start Date End Date Auth. Provider    clindamycin (CLEOCIN) 300 MG capsule Take 1 capsule (300 mg total) by mouth every 6 (six) hours. for 10 days 40 capsule 11/24/2023 12/4/2023 Austin Rivas MD    HYDROcodone-acetaminophen (NORCO)  5-325 mg per tablet Take 1 tablet by mouth every 6 (six) hours as needed for Pain. 12 tablet 11/24/2023 -- Austin Rivas MD          Follow-up Information       Follow up With Specialties Details Why Contact Info    [facility] Emergency Dept Emergency Medicine Go to  If symptoms worsen [address] [facility] [id number] [phone]             Austin Rivas MD  11/24/23 0427

## 2023-11-28 NOTE — PROGRESS NOTES
PELVIC MEDICINE AND RECONSTRUCTIVE SURGERY CONSULT NOTE    CHIEF COMPLAINT:  Follow up regarding recurrent UTIs    HISTORY OF PRESENT ILLNESS:   Karen Tpaia is a 48 y.o. female  Patient reports first noticing symptoms in 2023. She has sx of urgency and hesitancy.  She states she started taking a [employer] brand OTC remedy for UTI symptoms.  They are orange and red tablets.  She states she also gets UTIs when she holds her urine for a prolonged period of time.      She usually utilizes urgent cares for her UTI symptoms  Of note, she has DM2 and takes Metformin.  Her last A1C was 6.3 on 22.    Voids during the day: q3-4  Voids at nighttime: 0  Leakage of urine with coughing or exercise: No  -previous surgery? No  Leakage of urine with urgency: No  Pad for leakage of urine:no   -how often do you change your pad? N/A  Sensation of incomplete emptying: No  Splinting to void/BM: No  History of kidney stones No  Hematuria Yes - she states she has had that for a long time.  Always clean catch, no catheterized specimens.  > 3 UTIs in 12 months  Yes - E coli, GBBS, MRSA -all documented  - symptoms with UTI? Pressure, Uncomfortable after urinating  Stream: Normal    Interim Hx:  Patient doing well on the recurrent UTI regimen.  She has not had any issues since the last visit.    UTI Hx:  2023  >100K E coli   (Pansensitive) --> Cipro 500 mg PO BID x 7 days  2023  >100K S.agalactiae (GBS) --> Macrobid 100 mg PO BID x 1 day, then changed to Amoxil 875 mg PO BID x 10 days  2023  25-50K MRSA (R' Oxacillin) --> Bactrim DS PO BID x 5 days    Gyn Hx:  Patient reports h/o Normal paps; denies h/o Fibroids, denies h/o Endometriosis, denies h/o Ovarian Cysts, denies h/o abnormal paps, denies h/o LEEP/Cryo  Menopause age: 48  No LMP recorded. Patient is premenopausal.   V3  Obstetric complications? yes  Vaginal births? not applicable  -use of forceps or vacuum? no    OB History    Para Term   AB Living   3 2 2 0 0 2   SAB IAB Ectopic Multiple Live Births   0 0 0 0 3      # Outcome Date GA Lbr Karthik/2nd Weight Sex Delivery Anes PTL Lv   3 Term     M CS-Unspec   HÉCTOR   2     2.767 kg (6 lb 1.6 oz) F CS-Unspec   HÉCTOR   1 Term    3.243 kg (7 lb 2.4 oz) M CS-Unspec   DEC      Review of patient's allergies indicates:   Allergen Reactions    Codeine Rash        Current Outpatient Medications:     clindamycin (CLEOCIN) 300 MG capsule, Take 1 capsule (300 mg total) by mouth every 6 (six) hours. for 10 days, Disp: 40 capsule, Rfl: 0    cyanocobalamin (VITAMIN B-12) 1000 MCG tablet, Take 100 mcg by mouth once daily., Disp: , Rfl:     ergocalciferol (ERGOCALCIFEROL) 50,000 unit Cap, Take 50,000 Units by mouth every 7 days., Disp: , Rfl:     estradioL (ESTRACE) 0.01 % (0.1 mg/gram) vaginal cream, Use a pea sized amount to the vagina once a night for 14 nights when initiating the medication, then 2-3 times per week.  DO NOT use the applicator., Disp: 42.5 g, Rfl: 10    HYDROcodone-acetaminophen (NORCO) 5-325 mg per tablet, Take 1 tablet by mouth every 6 (six) hours as needed for Pain., Disp: 12 tablet, Rfl: 0    metFORMIN (GLUCOPHAGE-XR) 500 MG ER 24hr tablet, Take 1 tablet (500 mg total) by mouth once daily., Disp: 90 tablet, Rfl: 3    multivitamin with minerals tablet, Take 1 tablet by mouth once daily. Iron supplement PRN, Disp: , Rfl:     NON FORMULARY MEDICATION, Take 1 capsule by mouth Daily. Femdophilus, Disp: , Rfl:     NON FORMULARY MEDICATION, Take 2 tablets by mouth once daily. UriCalm, Disp: , Rfl:     Past Medical History:   Diagnosis Date    Anemia     Fracture of triquetrum 2020    Obesity 2022    Osteoarthritis 2022    Personal history of colonic polyps     Plantar fasciitis of right foot 2022    Prediabetes 2022    Primary hypertension 2022    Urinary tract infection     Vitamin D deficiency 2022     Past Surgical History:   Procedure Laterality  "Date     SECTION      , ,     COLONOSCOPY  2022       Family History   Problem Relation Age of Onset    Hypertension Father     Cancer Mother     Alzheimer's disease Mother     Diabetes Mother     Hypertension Mother     Pancreatic cancer Mother     Cancer Maternal Grandmother         Social History     Tobacco Use    Smoking status: Never    Smokeless tobacco: Never   Substance Use Topics    Alcohol use: Yes    Drug use: Never        Review of Systems   Psychological ROS: negative  Eyes: Blurring of vision  ENT ROS: Negative  Neuro ROS: Negative  Respiratory ROS: Negative  Gastrointestinal ROS: Negative  Cardiovascular ROS: Chest pain  Genitourinary ROS: Blood in the urine and Burning or pain with urination  Integumentary ROS: Sores  Musculoskeletal ROS: Fracture      Physical Exam:   /78   Pulse 67   Ht 5' 7" (1.702 m)   Wt 101.6 kg (224 lb)   BMI 35.08 kg/m²   General: No acute distress   Skin: no lesions  Musculoskeletal: normal lower extremity strength  Sensation to light touch in the lower extremities is normal   Extremities: well perfused with normal pulses in the distal extremities, no peripheral edema noted  Abdominal exam: soft non tender, no palpable masses, no scar  External genitalia: normal female genitalia, no lesions, normal female hair distribution, no clitoral enlargement, nontender, no scars  Sensation S2-S4 is present  The perineal reflexes are present  Exam Chaperoned by: Gin Mcginnis LPN  Vagina/cervix: atrophic vagina, no discharge, exudate, lesion, or erythema. Tissue improved from previous  Bimanual exam: Anteverted and Mobile, no palpable masses, non-tender exam  Urethra: no prolapse, caruncle, absent  Rectal: deferred  Stress test: negative  Urethral hypermobility: absent      POPQ (Date 2023): No significant prolapse noted    2023: Pap neg/HPV neg    ASSESSMENT:  Karen Tapia is a 48 y.o.   V0  1. Recurrent UTI    2. Stress " incontinence of urine    3. Genitourinary syndrome of menopause            PLAN:  The pathophysiology of the above condition has been discussed with the patient who expressed understanding.     Recurrent UTI - The patient has a history of recurrent UTI. The pathophysiology of the above conditions have been discussed with the patient. We reviewed the risks factors for recurrent UTI ( female, menopause, bacterial colonization) and the importance of culturing future symptomatic episodes to confirm the diagnosis. We discussed management options like cranberry supplements, vaginal estrogen, D-mannose, increased water consumption, prophylactic suppression and self treatment. At this time we will send the urine for culture and proceed with recurrent UTI regimen, standing urine culture, pyridium, probiotic, vaginal estrogen.  - Doing well on the regimen. Continue current management    Genitourinary syndrome of menopause/Vaginal atrophy - due to a hypoestrogenic state in the vagina. Though it is most common in postmenopausal women (up to 50-70%  of postmenopausal women being symptomatic at least to some degree), it can affect up to 15% of premenopausal women was well.  GSM is a spectrum of changes such as vaginal dryness, dyspareunia, irritation, burning, itching, dysuria, urgency, stress/urgency incontinence, recurrent UTIs, urethral prolapse, decreased elasticity, and ischemia of the vesical trigone. If unable to use hormonal treatments, patient can use vaginal moisturizers such as: Replens, Revaree, or Womaness Davily V Soothe. However, if there is no contraindication to use, vaginal estrogen is the best treatment for GSM. Encouraged use of vaginal estrogen as it will restore some elasticity to the vagina and has been shown to decrease the rate of recurrent UTIs, decrease vaginal pH, and increase vaginal lactobacillus.  A pea-sized amount to the vagina every night for 14 days, then 2-3 times per week.  Handout given on  low dose vaginal estrogen.  - Doing well with vaginal estrogen. Continue use    At the time the patient desires to continue with:  D-Mannose  Cranberry tablets  Probiotic  Estrace  Standing urine culture    Follow up 6 months    SHELBY Fragoso MD  Pelvic Medicine and Reconstructive Surgery  Urogynecology  [facility]    30 minutes was spent face to face with patient >50% of the time was spent in counseling and coordination of care.

## 2024-02-07 ENCOUNTER — OFFICE VISIT (OUTPATIENT)
Dept: FAMILY MEDICINE | Facility: CLINIC | Age: 49
End: 2024-02-07
Payer: COMMERCIAL

## 2024-02-07 VITALS
TEMPERATURE: 97 F | OXYGEN SATURATION: 99 % | BODY MASS INDEX: 35.5 KG/M2 | DIASTOLIC BLOOD PRESSURE: 62 MMHG | WEIGHT: 226.19 LBS | HEIGHT: 67 IN | HEART RATE: 82 BPM | RESPIRATION RATE: 18 BRPM | SYSTOLIC BLOOD PRESSURE: 112 MMHG

## 2024-02-07 DIAGNOSIS — F33.41 RECURRENT MAJOR DEPRESSIVE DISORDER, IN PARTIAL REMISSION: Primary | ICD-10-CM

## 2024-02-07 DIAGNOSIS — I10 PRIMARY HYPERTENSION: Chronic | ICD-10-CM

## 2024-02-07 PROCEDURE — 1160F RVW MEDS BY RX/DR IN RCRD: CPT | Mod: CPTII,,, | Performed by: FAMILY MEDICINE

## 2024-02-07 PROCEDURE — 3074F SYST BP LT 130 MM HG: CPT | Mod: CPTII,,, | Performed by: FAMILY MEDICINE

## 2024-02-07 PROCEDURE — 3008F BODY MASS INDEX DOCD: CPT | Mod: CPTII,,, | Performed by: FAMILY MEDICINE

## 2024-02-07 PROCEDURE — 99213 OFFICE O/P EST LOW 20 MIN: CPT | Mod: ,,, | Performed by: FAMILY MEDICINE

## 2024-02-07 PROCEDURE — 3078F DIAST BP <80 MM HG: CPT | Mod: CPTII,,, | Performed by: FAMILY MEDICINE

## 2024-02-07 PROCEDURE — 1159F MED LIST DOCD IN RCRD: CPT | Mod: CPTII,,, | Performed by: FAMILY MEDICINE

## 2024-02-07 RX ORDER — AMLODIPINE BESYLATE 5 MG/1
5 TABLET ORAL DAILY
Qty: 30 TABLET | Refills: 2 | Status: SHIPPED | OUTPATIENT
Start: 2024-02-07 | End: 2024-02-07 | Stop reason: ALTCHOICE

## 2024-02-07 NOTE — PROGRESS NOTES
Subjective:      Patient ID: Karen Tapia is a 49 y.o. female.    Chief Complaint: Follow-up (3 month f/u depression/anxiety)    Disclaimer:  This note is prepared using voice recognition software and as such is likely to have errors despite attempts at proofreading. Please contact me for questions.     HPI  49-year-old female who presents for follow-up of depression.  The patient has been hesitant to start antidepressants in the past.  She states that depression symptoms are stable.  She admits to psychosocial stressors at home and at work however she denies any recent increase in sadness, she denies decreased interest and denies suicidal ideation.  The patient states that she has used prayer and other coping mechanisms to improve symptoms.  She has been referred to Psychiatry x2 with her consent however on both occasions patient did not go for follow-up.  She  states that she did not attend the appointment for the most recent psychiatric visit due to disinterested  in seeing the physician based on reviews read online.  Hypertension:  Patient states that she has not been monitoring her blood pressure at home.  She has not currently taking any antihypertensives and BP on arrival not at goal.  She denies any headache blurred vision or chest pain.    Past Medical History:   Diagnosis Date    Anemia     Fracture of triquetrum 06/17/2020    Obesity 12/21/2022    Osteoarthritis 12/21/2022    Personal history of colonic polyps     Plantar fasciitis of right foot 12/21/2022    Prediabetes 12/21/2022    Primary hypertension 12/21/2022    Urinary tract infection     Vitamin D deficiency 12/21/2022        Current Outpatient Medications on File Prior to Visit   Medication Sig Dispense Refill    cyanocobalamin (VITAMIN B-12) 1000 MCG tablet Take 100 mcg by mouth once daily.      ergocalciferol (ERGOCALCIFEROL) 50,000 unit Cap Take 50,000 Units by mouth every 7 days.      estradioL (ESTRACE) 0.01 % (0.1 mg/gram) vaginal  "cream Use a pea sized amount to the vagina once a night for 14 nights when initiating the medication, then 2-3 times per week.  DO NOT use the applicator. 42.5 g 10    metFORMIN (GLUCOPHAGE-XR) 500 MG ER 24hr tablet Take 1 tablet (500 mg total) by mouth once daily. 90 tablet 3    multivitamin with minerals tablet Take 1 tablet by mouth once daily. Iron supplement PRN      NON FORMULARY MEDICATION Take 1 capsule by mouth Daily. Femdophilus      NON FORMULARY MEDICATION Take 2 tablets by mouth once daily. UriCalm      [DISCONTINUED] HYDROcodone-acetaminophen (NORCO) 5-325 mg per tablet Take 1 tablet by mouth every 6 (six) hours as needed for Pain. (Patient not taking: Reported on 2/7/2024) 12 tablet 0     No current facility-administered medications on file prior to visit.        Review of patient's allergies indicates:   Allergen Reactions    Codeine Rash      Review of Systems   Constitutional:  Negative for chills, diaphoresis and fever.   Eyes:  Negative for blurred vision and double vision.   Respiratory:  Negative for cough and shortness of breath.    Cardiovascular:  Negative for chest pain and leg swelling.   Gastrointestinal:  Negative for abdominal pain, diarrhea, nausea and vomiting.   Skin:  Negative for rash.   Neurological:  Negative for dizziness, tremors and headaches.       Objective:     Vitals:    02/07/24 1322 02/07/24 1400   BP: (!) 140/78 112/62   BP Location: Right arm Left arm   Patient Position: Sitting Sitting   BP Method:  Large (Manual)   Pulse: 82    Resp: 18    Temp: 97.2 °F (36.2 °C)    TempSrc: Temporal    SpO2: 99%    Weight: 102.6 kg (226 lb 3.2 oz)    Height: 5' 7" (1.702 m)      Physical Exam  Vitals reviewed.   Constitutional:       General: She is not in acute distress.     Appearance: Normal appearance. She is not ill-appearing, toxic-appearing or diaphoretic.   HENT:      Head: Normocephalic.   Eyes:      General:         Right eye: No discharge.         Left eye: No discharge. "      Extraocular Movements: Extraocular movements intact.      Conjunctiva/sclera: Conjunctivae normal.   Cardiovascular:      Rate and Rhythm: Normal rate and regular rhythm.      Pulses: Normal pulses.      Heart sounds: Normal heart sounds. No murmur heard.     No friction rub. No gallop.   Pulmonary:      Effort: Pulmonary effort is normal. No respiratory distress.      Breath sounds: Normal breath sounds. No stridor. No wheezing, rhonchi or rales.   Musculoskeletal:         General: Normal range of motion.      Cervical back: Normal range of motion and neck supple. No rigidity.   Skin:     General: Skin is warm and dry.      Coloration: Skin is not pale.   Neurological:      General: No focal deficit present.      Mental Status: She is alert and oriented to person, place, and time. Mental status is at baseline.   Psychiatric:         Mood and Affect: Mood normal.         Behavior: Behavior normal.         Thought Content: Thought content normal.         Judgment: Judgment normal.         Assessment:     1. Recurrent major depressive disorder, in partial remission    2. Primary hypertension      Plan:     1. Recurrent major depressive disorder, in partial remission  Stable   Denies suicidal ideation.  Recommend relaxation techniques and coping strategies (i.e. essential oils, deep breathing, exercise, etc).  Seek immediate medical treatment for SOB, persistent panic attack, chest pain, suicidal thoughts or hallucinations.    2. Primary hypertension  Repeat BP at goal.  Low sodium diet and exercise recommended  Monitor BP at home and notify MD if sBP >160 or <90. Also notify MD if dBP >100 or <60.  Limit caffeine intake.  Seek immediate medical treatment for chest pain, SOB, LE edema, severe headache, blurred vision, dizziness, slurred speech, any new or worsening symptoms.      Follow up in about 3 months (around 5/7/2024) for HTN Follow Up.  Karen Bonds Regina was given education on their disease process and  medications.

## 2024-04-04 ENCOUNTER — OFFICE VISIT (OUTPATIENT)
Dept: FAMILY MEDICINE | Facility: CLINIC | Age: 49
End: 2024-04-04
Payer: COMMERCIAL

## 2024-04-04 ENCOUNTER — CLINICAL SUPPORT (OUTPATIENT)
Dept: RESPIRATORY THERAPY | Facility: HOSPITAL | Age: 49
End: 2024-04-04
Attending: FAMILY MEDICINE
Payer: COMMERCIAL

## 2024-04-04 VITALS
WEIGHT: 225.63 LBS | SYSTOLIC BLOOD PRESSURE: 121 MMHG | RESPIRATION RATE: 18 BRPM | HEIGHT: 67 IN | BODY MASS INDEX: 35.41 KG/M2 | TEMPERATURE: 98 F | HEART RATE: 73 BPM | DIASTOLIC BLOOD PRESSURE: 78 MMHG | OXYGEN SATURATION: 99 %

## 2024-04-04 DIAGNOSIS — M79.602 PAIN OF LEFT UPPER EXTREMITY: Primary | ICD-10-CM

## 2024-04-04 DIAGNOSIS — M79.602 PAIN OF LEFT UPPER EXTREMITY: ICD-10-CM

## 2024-04-04 PROCEDURE — 3074F SYST BP LT 130 MM HG: CPT | Mod: CPTII,,, | Performed by: FAMILY MEDICINE

## 2024-04-04 PROCEDURE — 1160F RVW MEDS BY RX/DR IN RCRD: CPT | Mod: CPTII,,, | Performed by: FAMILY MEDICINE

## 2024-04-04 PROCEDURE — 99213 OFFICE O/P EST LOW 20 MIN: CPT | Mod: ,,, | Performed by: FAMILY MEDICINE

## 2024-04-04 PROCEDURE — 93005 ELECTROCARDIOGRAM TRACING: CPT

## 2024-04-04 PROCEDURE — 1159F MED LIST DOCD IN RCRD: CPT | Mod: CPTII,,, | Performed by: FAMILY MEDICINE

## 2024-04-04 PROCEDURE — 3078F DIAST BP <80 MM HG: CPT | Mod: CPTII,,, | Performed by: FAMILY MEDICINE

## 2024-04-04 PROCEDURE — 3008F BODY MASS INDEX DOCD: CPT | Mod: CPTII,,, | Performed by: FAMILY MEDICINE

## 2024-04-04 PROCEDURE — 93010 ELECTROCARDIOGRAM REPORT: CPT | Mod: ,,, | Performed by: INTERNAL MEDICINE

## 2024-04-04 RX ORDER — GABAPENTIN 100 MG/1
100 CAPSULE ORAL 3 TIMES DAILY
Qty: 90 CAPSULE | Refills: 0 | Status: SHIPPED | OUTPATIENT
Start: 2024-04-04 | End: 2024-05-20

## 2024-04-04 RX ORDER — AMLODIPINE BESYLATE 5 MG/1
5 TABLET ORAL
COMMUNITY
Start: 2024-02-07 | End: 2024-05-03

## 2024-04-04 NOTE — PROGRESS NOTES
"Subjective:      Patient ID: Karen Tapia is a 49 y.o. female.    Chief Complaint: Pain (Patient reports pain and numbness to left arm. Patient reports having trouble with gripping objects with left hand. Tingling )    Disclaimer:  This note is prepared using voice recognition software and as such is likely to have errors despite attempts at proofreading. Please contact me for questions.     49yoF who presents for evaluation of left arm discomfort.  Patient admits to pain, states she can't describe the pain and is unsure how long it lasts. She states she thinks is caused by a "pinch nerve." She is unsure if the pain is associatred with certain movements but she has noticed pain is more prevalent at rest. She has tried Aleve and Ibuprofen which improves symptoms however it returns. She has chronic numbness and tingling in her left hand.  MRI brain with and without contrast in 11/2023 that showed no acute abnormalities.  Vitamin B12 level, TSH and vitamin-D level within normal limits in 10/2023.  Patient also had a mildly elevated hemoglobin A1c of 5.7 in 10/2023.    Past Medical History:   Diagnosis Date    Anemia     Fracture of triquetrum 06/17/2020    Obesity 12/21/2022    Osteoarthritis 12/21/2022    Personal history of colonic polyps     Plantar fasciitis of right foot 12/21/2022    Prediabetes 12/21/2022    Primary hypertension 12/21/2022    Urinary tract infection     Vitamin D deficiency 12/21/2022        Current Outpatient Medications on File Prior to Visit   Medication Sig Dispense Refill    cyanocobalamin (VITAMIN B-12) 1000 MCG tablet Take 100 mcg by mouth once daily.      ergocalciferol (ERGOCALCIFEROL) 50,000 unit Cap Take 50,000 Units by mouth every 7 days.      estradioL (ESTRACE) 0.01 % (0.1 mg/gram) vaginal cream Use a pea sized amount to the vagina once a night for 14 nights when initiating the medication, then 2-3 times per week.  DO NOT use the applicator. 42.5 g 10    metFORMIN " "(GLUCOPHAGE-XR) 500 MG ER 24hr tablet Take 1 tablet (500 mg total) by mouth once daily. 90 tablet 3    multivitamin with minerals tablet Take 1 tablet by mouth once daily. Iron supplement PRN      NON FORMULARY MEDICATION Take 2 tablets by mouth once daily. UriCalm      amLODIPine (NORVASC) 5 MG tablet Take 5 mg by mouth.      NON FORMULARY MEDICATION Take 1 capsule by mouth Daily. Femdophilus       No current facility-administered medications on file prior to visit.        Review of patient's allergies indicates:   Allergen Reactions    Codeine Rash      Review of Systems   Constitutional:  Negative for chills, diaphoresis and fever.   Eyes:  Negative for double vision.   Respiratory:  Negative for cough and shortness of breath.    Cardiovascular:  Negative for chest pain.   Gastrointestinal:  Negative for nausea and vomiting.   Musculoskeletal:  Positive for falls and myalgias.   Skin:  Negative for rash.   Neurological:  Positive for tingling. Negative for dizziness, tremors and headaches.       Objective:     Vitals:    04/04/24 0915   BP: 121/78   BP Location: Right arm   Patient Position: Sitting   Pulse: 73   Resp: 18   Temp: 97.9 °F (36.6 °C)   TempSrc: Oral   SpO2: 99%   Weight: 102.3 kg (225 lb 9.6 oz)   Height: 5' 7" (1.702 m)     Physical Exam  Constitutional:       General: She is not in acute distress.     Appearance: Normal appearance. She is not ill-appearing, toxic-appearing or diaphoretic.   HENT:      Head: Normocephalic and atraumatic.      Right Ear: External ear normal.      Left Ear: External ear normal.      Nose: Nose normal.   Eyes:      Extraocular Movements: Extraocular movements intact.      Conjunctiva/sclera: Conjunctivae normal.   Pulmonary:      Effort: Pulmonary effort is normal. No respiratory distress.   Musculoskeletal:      Right shoulder: Normal. No swelling, deformity, effusion, laceration, tenderness, bony tenderness or crepitus. Normal range of motion. Normal strength. " Normal pulse.      Left shoulder: Normal. No swelling, deformity, effusion, laceration, tenderness, bony tenderness or crepitus. Normal range of motion. Normal strength. Normal pulse.      Right upper arm: Normal. No swelling, edema, deformity, lacerations, tenderness or bony tenderness.      Left upper arm: Normal. No swelling, edema, deformity, lacerations, tenderness or bony tenderness.      Right elbow: Normal. No swelling. Normal range of motion.      Left elbow: Normal. No swelling, deformity, effusion or lacerations. Normal range of motion. No tenderness.      Right forearm: Normal. No swelling or tenderness.      Left forearm: Normal. No swelling, edema, deformity, lacerations, tenderness or bony tenderness.      Right wrist: No swelling or effusion. Normal range of motion.      Left wrist: No swelling, effusion, tenderness, bony tenderness or crepitus. Normal range of motion. Normal pulse.      Cervical back: Normal range of motion.      Comments: Negative Phalen and Tinel's sign however then patient states that she has had numbness and tingling in the hand for the entire exam and no worsening during exam.   Skin:     Coloration: Skin is not pale.   Neurological:      General: No focal deficit present.      Mental Status: She is alert and oriented to person, place, and time. Mental status is at baseline.   Psychiatric:         Mood and Affect: Mood normal.         Behavior: Behavior normal.         Thought Content: Thought content normal.         Judgment: Judgment normal.         Assessment:     1. Pain of left upper extremity      Plan:     1. Pain of left upper extremity  - gabapentin (NEURONTIN) 100 MG capsule; Take 1 capsule (100 mg total) by mouth 3 (three) times daily.  Dispense: 90 capsule; Refill: 0  - EMG W/ ULTRASOUND AND NERVE CONDUCTION TEST 1 Extremity; Future  - SCHEDULED EKG 12-LEAD (to Muse); Future   Recommend wrist brace at bedtime.    Trial of gabapentin.    Nerve conduction studies and  EKG ordered and pending.    Patient admits to previous full cardiac workup by [facility].    Follow up if symptoms worsen or fail to improve.  Karen Tapia was given education on their disease process and medications.

## 2024-04-04 NOTE — PATIENT INSTRUCTIONS
Wear wrist brace for 2 weeks.  Start gabapentin as prescribed. Take first dosage at bedtime to monitor for drowsiness.  Nerve conduction studies and EKG ordered for you.

## 2024-04-08 LAB
OHS QRS DURATION: 82 MS
OHS QTC CALCULATION: 430 MS

## 2024-04-15 ENCOUNTER — PATIENT MESSAGE (OUTPATIENT)
Dept: ADMINISTRATIVE | Facility: HOSPITAL | Age: 49
End: 2024-04-15
Payer: COMMERCIAL

## 2024-04-25 ENCOUNTER — TELEPHONE (OUTPATIENT)
Dept: UROGYNECOLOGY | Facility: CLINIC | Age: 49
End: 2024-04-25
Payer: COMMERCIAL

## 2024-05-03 DIAGNOSIS — I10 ESSENTIAL (PRIMARY) HYPERTENSION: ICD-10-CM

## 2024-05-03 RX ORDER — AMLODIPINE BESYLATE 5 MG/1
5 TABLET ORAL
Qty: 90 TABLET | Refills: 2 | Status: SHIPPED | OUTPATIENT
Start: 2024-05-03

## 2024-05-19 DIAGNOSIS — M79.602 PAIN OF LEFT UPPER EXTREMITY: ICD-10-CM

## 2024-05-20 RX ORDER — GABAPENTIN 100 MG/1
CAPSULE ORAL
Qty: 90 CAPSULE | Refills: 3 | Status: SHIPPED | OUTPATIENT
Start: 2024-05-20

## 2024-05-28 ENCOUNTER — LAB VISIT (OUTPATIENT)
Dept: LAB | Facility: HOSPITAL | Age: 49
End: 2024-05-28
Attending: FAMILY MEDICINE
Payer: COMMERCIAL

## 2024-05-28 ENCOUNTER — PATIENT MESSAGE (OUTPATIENT)
Dept: FAMILY MEDICINE | Facility: CLINIC | Age: 49
End: 2024-05-28

## 2024-05-28 ENCOUNTER — TELEPHONE (OUTPATIENT)
Dept: FAMILY MEDICINE | Facility: CLINIC | Age: 49
End: 2024-05-28

## 2024-05-28 ENCOUNTER — OFFICE VISIT (OUTPATIENT)
Dept: FAMILY MEDICINE | Facility: CLINIC | Age: 49
End: 2024-05-28
Payer: COMMERCIAL

## 2024-05-28 VITALS
HEART RATE: 60 BPM | DIASTOLIC BLOOD PRESSURE: 82 MMHG | HEIGHT: 67 IN | WEIGHT: 232.88 LBS | TEMPERATURE: 98 F | OXYGEN SATURATION: 98 % | BODY MASS INDEX: 36.55 KG/M2 | SYSTOLIC BLOOD PRESSURE: 126 MMHG | RESPIRATION RATE: 18 BRPM

## 2024-05-28 DIAGNOSIS — Z11.3 SCREENING FOR STD (SEXUALLY TRANSMITTED DISEASE): ICD-10-CM

## 2024-05-28 DIAGNOSIS — I10 PRIMARY HYPERTENSION: Primary | Chronic | ICD-10-CM

## 2024-05-28 DIAGNOSIS — E66.01 SEVERE OBESITY (BMI 35.0-39.9) WITH COMORBIDITY: ICD-10-CM

## 2024-05-28 LAB
C TRACH DNA SPEC QL NAA+PROBE: NOT DETECTED
HIV 1+2 AB+HIV1 P24 AG SERPL QL IA: NONREACTIVE
N GONORRHOEA DNA SPEC QL NAA+PROBE: NOT DETECTED
SOURCE (OHS): NORMAL
T VAGINALIS GENITAL QL WET PREP: NORMAL

## 2024-05-28 PROCEDURE — 99214 OFFICE O/P EST MOD 30 MIN: CPT | Mod: ,,, | Performed by: FAMILY MEDICINE

## 2024-05-28 PROCEDURE — 87210 SMEAR WET MOUNT SALINE/INK: CPT

## 2024-05-28 PROCEDURE — 86780 TREPONEMA PALLIDUM: CPT

## 2024-05-28 PROCEDURE — 1160F RVW MEDS BY RX/DR IN RCRD: CPT | Mod: CPTII,,, | Performed by: FAMILY MEDICINE

## 2024-05-28 PROCEDURE — 3079F DIAST BP 80-89 MM HG: CPT | Mod: CPTII,,, | Performed by: FAMILY MEDICINE

## 2024-05-28 PROCEDURE — 36415 COLL VENOUS BLD VENIPUNCTURE: CPT

## 2024-05-28 PROCEDURE — 87389 HIV-1 AG W/HIV-1&-2 AB AG IA: CPT

## 2024-05-28 PROCEDURE — 3074F SYST BP LT 130 MM HG: CPT | Mod: CPTII,,, | Performed by: FAMILY MEDICINE

## 2024-05-28 PROCEDURE — 87491 CHLMYD TRACH DNA AMP PROBE: CPT

## 2024-05-28 PROCEDURE — 3008F BODY MASS INDEX DOCD: CPT | Mod: CPTII,,, | Performed by: FAMILY MEDICINE

## 2024-05-28 PROCEDURE — 1159F MED LIST DOCD IN RCRD: CPT | Mod: CPTII,,, | Performed by: FAMILY MEDICINE

## 2024-05-28 NOTE — PROGRESS NOTES
Subjective:      Patient ID: Karen Tapia is a 49 y.o. female.    Chief Complaint: Follow-up (HTN f/u and patient requesting STI testing)    Disclaimer:  This note is prepared using voice recognition software and as such is likely to have errors despite attempts at proofreading. Please contact me for questions.     49-year-old female who presents for follow-up of hypertension and requests STD testing.  The patient admits to a mostly unhealthy diet however she has been eating Greek yogurt and smoothies add fruit and vegetables to her diet.  She is also walking for exercise which she states has also helped with chronic back pain.  The patient admits to compliance with amlodipine 5 mg daily.  She requests STD screening as she has recently and admits to currently being sexually active.      Preventative Health:  Cervical Cancer Screening - Last Pap 12/2022 at [facility].   Breast Cancer Screening - Last Mammogram 05/09/2024 BIRADS 2, benign. Repeat in 1 year.  Colon Cancer Screening - Colonoscopy on 09/2022, colon polyps s/p polypectomy. Repeats in 5 years.      Past Medical History:   Diagnosis Date    Anemia     Fracture of triquetrum 06/17/2020    Obesity 12/21/2022    Osteoarthritis 12/21/2022    Personal history of colonic polyps     Plantar fasciitis of right foot 12/21/2022    Prediabetes 12/21/2022    Primary hypertension 12/21/2022    Urinary tract infection     Vitamin D deficiency 12/21/2022        Current Outpatient Medications on File Prior to Visit   Medication Sig Dispense Refill    amLODIPine (NORVASC) 5 MG tablet TAKE 1 TABLET BY MOUTH EVERY DAY 90 tablet 2    cyanocobalamin (VITAMIN B-12) 1000 MCG tablet Take 100 mcg by mouth once daily.      ergocalciferol (ERGOCALCIFEROL) 50,000 unit Cap Take 50,000 Units by mouth every 7 days.      estradioL (ESTRACE) 0.01 % (0.1 mg/gram) vaginal cream Use a pea sized amount to the vagina once a night for 14 nights when initiating the medication,  "then 2-3 times per week.  DO NOT use the applicator. 42.5 g 10    gabapentin (NEURONTIN) 100 MG capsule TAKE 1 CAPSULE (100 MG TOTAL) BY MOUTH THREE TIMES DAILY. 90 capsule 3    metFORMIN (GLUCOPHAGE-XR) 500 MG ER 24hr tablet Take 1 tablet (500 mg total) by mouth once daily. 90 tablet 3    multivitamin with minerals tablet Take 1 tablet by mouth once daily. Iron supplement PRN      NON FORMULARY MEDICATION Take 1 capsule by mouth Daily. Femdophilus      NON FORMULARY MEDICATION Take 2 tablets by mouth once daily. UriCalm       No current facility-administered medications on file prior to visit.        Review of patient's allergies indicates:   Allergen Reactions    Codeine Rash      Review of Systems   Constitutional:  Negative for chills, diaphoresis and fever.   Eyes:  Negative for blurred vision and double vision.   Respiratory:  Negative for cough and shortness of breath.    Cardiovascular:  Negative for chest pain and leg swelling.   Gastrointestinal:  Negative for abdominal pain, diarrhea, nausea and vomiting.   Skin:  Negative for rash.   Neurological:  Negative for dizziness, tremors and headaches.       Objective:     Vitals:    05/28/24 0906   BP: 126/82   BP Location: Right arm   Patient Position: Sitting   Pulse: 60   Resp: 18   Temp: 98.2 °F (36.8 °C)   TempSrc: Oral   SpO2: 98%   Weight: 105.6 kg (232 lb 14.4 oz)   Height: 5' 7" (1.702 m)     Physical Exam  Vitals reviewed.   Constitutional:       General: She is not in acute distress.     Appearance: Normal appearance. She is not ill-appearing, toxic-appearing or diaphoretic.   HENT:      Head: Normocephalic.   Eyes:      General:         Right eye: No discharge.         Left eye: No discharge.      Extraocular Movements: Extraocular movements intact.      Conjunctiva/sclera: Conjunctivae normal.   Cardiovascular:      Rate and Rhythm: Normal rate and regular rhythm.      Pulses: Normal pulses.      Heart sounds: Normal heart sounds. No murmur " heard.     No friction rub. No gallop.   Pulmonary:      Effort: Pulmonary effort is normal. No respiratory distress.      Breath sounds: Normal breath sounds. No stridor. No wheezing, rhonchi or rales.   Musculoskeletal:         General: Normal range of motion.      Cervical back: Normal range of motion and neck supple. No rigidity.   Skin:     General: Skin is warm and dry.      Coloration: Skin is not pale.   Neurological:      General: No focal deficit present.      Mental Status: She is alert and oriented to person, place, and time. Mental status is at baseline.   Psychiatric:         Mood and Affect: Mood normal.         Behavior: Behavior normal.         Thought Content: Thought content normal.         Judgment: Judgment normal.         Assessment:     1. Primary hypertension    2. Severe obesity (BMI 35.0-39.9) with comorbidity    3. Finger pain, left    4. Screening for STD (sexually transmitted disease)      Plan:     1. Primary hypertension  BP at goal  Continue amlodipine as prescribed.  Low sodium diet and exercise recommended  Monitor BP at home and notify MD if sBP >160 or <90. Also notify MD if dBP >100 or <60.  Limit caffeine intake.  Seek immediate medical treatment for chest pain, SOB, LE edema, severe headache, blurred vision, dizziness, slurred speech, any new or worsening symptoms.    2. Severe obesity (BMI 35.0-39.9) with comorbidity  Well-balanced diet and exercise encouraged.    Current BMI 36.48.  Initial goal BMI less than 30.    3. Screening for STD (sexually transmitted disease)  - Chlamydia/GC, PCR; Future  - Trichomonas Prep Wet Mount; Future  - HIV 1/2 Ag/Ab (4th Gen); Future  - Syphilis Ab, TP-PA; Future       Follow up in about 6 months (around 11/28/2024) for Wellness Visit.  Karen Tapia was given education on their disease process and medications.

## 2024-05-30 LAB — T PALLIDUM AB SER QL AGGL: NEGATIVE

## 2024-05-31 ENCOUNTER — TELEPHONE (OUTPATIENT)
Dept: FAMILY MEDICINE | Facility: CLINIC | Age: 49
End: 2024-05-31
Payer: COMMERCIAL

## 2024-06-15 ENCOUNTER — OFFICE VISIT (OUTPATIENT)
Dept: URGENT CARE | Facility: CLINIC | Age: 49
End: 2024-06-15
Payer: COMMERCIAL

## 2024-06-15 VITALS
WEIGHT: 235.25 LBS | SYSTOLIC BLOOD PRESSURE: 117 MMHG | HEART RATE: 89 BPM | BODY MASS INDEX: 39.2 KG/M2 | RESPIRATION RATE: 20 BRPM | TEMPERATURE: 99 F | HEIGHT: 65 IN | OXYGEN SATURATION: 97 % | DIASTOLIC BLOOD PRESSURE: 76 MMHG

## 2024-06-15 DIAGNOSIS — R35.0 URINARY FREQUENCY: ICD-10-CM

## 2024-06-15 DIAGNOSIS — N30.90 CYSTITIS: Primary | ICD-10-CM

## 2024-06-15 DIAGNOSIS — L03.012 PARONYCHIA OF FINGER OF LEFT HAND: ICD-10-CM

## 2024-06-15 LAB
BILIRUB UR QL STRIP: NEGATIVE
GLUCOSE UR QL STRIP: NEGATIVE
KETONES UR QL STRIP: POSITIVE
LEUKOCYTE ESTERASE UR QL STRIP: POSITIVE
PH, POC UA: 6
POC BLOOD, URINE: POSITIVE
POC NITRATES, URINE: POSITIVE
PROT UR QL STRIP: POSITIVE
SP GR UR STRIP: >=1.03 (ref 1–1.03)
UROBILINOGEN UR STRIP-ACNC: ABNORMAL (ref 0.1–1.1)

## 2024-06-15 PROCEDURE — 87086 URINE CULTURE/COLONY COUNT: CPT | Performed by: FAMILY MEDICINE

## 2024-06-15 PROCEDURE — 81003 URINALYSIS AUTO W/O SCOPE: CPT | Mod: PBBFAC | Performed by: FAMILY MEDICINE

## 2024-06-15 PROCEDURE — 99214 OFFICE O/P EST MOD 30 MIN: CPT | Mod: PBBFAC | Performed by: FAMILY MEDICINE

## 2024-06-15 PROCEDURE — 99214 OFFICE O/P EST MOD 30 MIN: CPT | Mod: S$PBB,,, | Performed by: FAMILY MEDICINE

## 2024-06-15 RX ORDER — AMOXICILLIN AND CLAVULANATE POTASSIUM 875; 125 MG/1; MG/1
1 TABLET, FILM COATED ORAL 2 TIMES DAILY
Qty: 14 TABLET | Refills: 0 | Status: SHIPPED | OUTPATIENT
Start: 2024-06-15 | End: 2024-06-22

## 2024-06-15 NOTE — PROGRESS NOTES
"Subjective:       Patient ID: Karen Tapia is a 49 y.o. female.    Vitals:  height is 5' 5" (1.651 m) and weight is 106.7 kg (235 lb 3.7 oz). Her temperature is 98.8 °F (37.1 °C). Her blood pressure is 117/76 and her pulse is 89. Her respiration is 20 and oxygen saturation is 97%.     Chief Complaint: Urinary Frequency (X 1wk ) and Finger Pain (LT hand 3rd digit redness/edema around nail x 3days)    Patient presents to urgent care clinic with 2 issues:    -has a history of recurrent UTI, having an exacerbation of dysuria and urinary frequency after recently running out of probiotics.  No vaginal discharge or bleeding.  No abdominal or pelvic pain.  No new back or flank pain.  No fever.  No diarrhea.  She is followed by urologist.    -noticed redness and a small amount of swelling over the medial nail fold of the left index finger.  No drainage.  No precipitating event.    All other systems are negative    Chart reviewed.  Previous urine cultures reviewed    Objective:   Physical Exam   Constitutional:  Non-toxic appearance. She does not appear ill. No distress.   Neck: Neck supple.   Abdominal: Normal appearance. She exhibits no distension. flat abdomen There is no abdominal tenderness. There is no rebound, no guarding, no left CVA tenderness and no right CVA tenderness.   Musculoskeletal:        Hands:    Neurological: no focal deficit. She is alert.   Skin: Skin is not diaphoretic.   Psychiatric: Her behavior is normal. Mood normal.   Nursing note and vitals reviewed.        Assessment:     1. Cystitis    2. Paronychia of finger of left hand    3. Urinary frequency            Plan:   Will give a course of Augmentin in hopes of covering both paronychia and UTI.  We discussed the possibility that this may not be adequate coverage depending on urine culture results.  Will notify her if indicated.  Encourage fluids, follow-up with PCP and Urology.    Keep finger clean, Augmentin, avoid manipulation.  Return for " recheck if not improving in several days.      Cystitis  -     Urine culture  -     amoxicillin-clavulanate 875-125mg (AUGMENTIN) 875-125 mg per tablet; Take 1 tablet by mouth 2 (two) times daily. for 7 days  Dispense: 14 tablet; Refill: 0    Paronychia of finger of left hand  -     amoxicillin-clavulanate 875-125mg (AUGMENTIN) 875-125 mg per tablet; Take 1 tablet by mouth 2 (two) times daily. for 7 days  Dispense: 14 tablet; Refill: 0    Urinary frequency  -     POCT Urinalysis, Dipstick, Automated, W/O Scope        Please note: This chart was completed via voice to text dictation. It may contain typographical/word recognition errors. If there are any questions, please contact the provider for final clarification.

## 2024-06-15 NOTE — LETTER
Sharifa 15, 2024      [facility]  [address] [address]  Phone: [phone]       Patient: Karen Tapia   YOB: 1975  Date of Visit: 06/15/2024    To Whom It May Concern:    Loy Tapia  was at [facility] on 06/15/2024. The patient may return to work/school on TODAY with no restrictions. If you have any questions or concerns, or if I can be of further assistance, please do not hesitate to contact me.    Sincerely,    DONTE NORIEGA MD

## 2024-06-17 LAB — BACTERIA UR CULT: ABNORMAL

## 2024-06-18 DIAGNOSIS — R73.03 PREDIABETES: ICD-10-CM

## 2024-06-18 RX ORDER — METFORMIN HYDROCHLORIDE 500 MG/1
500 TABLET, EXTENDED RELEASE ORAL
Qty: 30 TABLET | Refills: 11 | Status: SHIPPED | OUTPATIENT
Start: 2024-06-18

## 2024-07-03 ENCOUNTER — OFFICE VISIT (OUTPATIENT)
Dept: GYNECOLOGY | Facility: CLINIC | Age: 49
End: 2024-07-03
Payer: COMMERCIAL

## 2024-07-03 ENCOUNTER — LAB VISIT (OUTPATIENT)
Dept: LAB | Facility: HOSPITAL | Age: 49
End: 2024-07-03
Payer: COMMERCIAL

## 2024-07-03 VITALS
WEIGHT: 230.38 LBS | BODY MASS INDEX: 38.38 KG/M2 | TEMPERATURE: 98 F | HEART RATE: 66 BPM | SYSTOLIC BLOOD PRESSURE: 124 MMHG | HEIGHT: 65 IN | RESPIRATION RATE: 18 BRPM | DIASTOLIC BLOOD PRESSURE: 84 MMHG | OXYGEN SATURATION: 98 %

## 2024-07-03 DIAGNOSIS — N93.9 ABNORMAL UTERINE BLEEDING: ICD-10-CM

## 2024-07-03 DIAGNOSIS — N93.9 ABNORMAL UTERINE BLEEDING (AUB): ICD-10-CM

## 2024-07-03 DIAGNOSIS — N84.1 POLYP AT CERVICAL OS: Primary | ICD-10-CM

## 2024-07-03 LAB
BACTERIA #/AREA URNS AUTO: ABNORMAL /HPF
BILIRUB UR QL STRIP.AUTO: NEGATIVE
CLARITY UR: CLEAR
COLOR UR AUTO: ABNORMAL
ERYTHROCYTE [DISTWIDTH] IN BLOOD BY AUTOMATED COUNT: 14.6 % (ref 11.5–17)
GLUCOSE UR QL STRIP: NORMAL
HCT VFR BLD AUTO: 36.3 % (ref 37–47)
HGB BLD-MCNC: 11.6 G/DL (ref 12–16)
HGB UR QL STRIP: ABNORMAL
HYALINE CASTS #/AREA URNS LPF: ABNORMAL /LPF
KETONES UR QL STRIP: NEGATIVE
LEUKOCYTE ESTERASE UR QL STRIP: 25
MCH RBC QN AUTO: 27.1 PG (ref 27–31)
MCHC RBC AUTO-ENTMCNC: 32 G/DL (ref 33–36)
MCV RBC AUTO: 84.8 FL (ref 80–94)
NITRITE UR QL STRIP: NEGATIVE
NRBC BLD AUTO-RTO: 0 %
PH UR STRIP: 6 [PH]
PLATELET # BLD AUTO: 266 X10(3)/MCL (ref 130–400)
PMV BLD AUTO: 10.3 FL (ref 7.4–10.4)
PROT UR QL STRIP: NEGATIVE
RBC # BLD AUTO: 4.28 X10(6)/MCL (ref 4.2–5.4)
RBC #/AREA URNS AUTO: ABNORMAL /HPF
SP GR UR STRIP.AUTO: 1.01 (ref 1–1.03)
SQUAMOUS #/AREA URNS LPF: ABNORMAL /HPF
TSH SERPL-ACNC: 1.12 UIU/ML (ref 0.35–4.94)
UROBILINOGEN UR STRIP-ACNC: NORMAL
WBC # BLD AUTO: 4.66 X10(3)/MCL (ref 4.5–11.5)
WBC #/AREA URNS AUTO: ABNORMAL /HPF

## 2024-07-03 PROCEDURE — 99214 OFFICE O/P EST MOD 30 MIN: CPT | Mod: PBBFAC

## 2024-07-03 PROCEDURE — 84443 ASSAY THYROID STIM HORMONE: CPT

## 2024-07-03 PROCEDURE — 85027 COMPLETE CBC AUTOMATED: CPT

## 2024-07-03 PROCEDURE — 36415 COLL VENOUS BLD VENIPUNCTURE: CPT

## 2024-07-03 PROCEDURE — 81015 MICROSCOPIC EXAM OF URINE: CPT

## 2024-07-03 NOTE — PROGRESS NOTES
[facility] GYNECOLOGY CLINIC NOTE     Karen Tapia is a 49 y.o.  presenting to GYN clinic for episode of heavy bleeding.     Patient then reports that she had heavy bleeding starting on  that lasted 1 week. Reports that she was using 5-6 pads per day. Patient also reports seeing large clots with cramping and breast sensitivity. Previously had heavy menstrual periods and was on Depo Provera for management. Patient reports that it has been at least 1 year since she last had a dose. The entire time that she was on the Depo Provera, she had no bleeding. Patient reports that for a few months, patient started having spotting. Denies hot flashes, vaginal dryness.      Patient denies abnormal discharge, pelvic pain, abdominal pain, or dysuria.     GYN Hx:  Sexual activity: currently not sexually active. Last sexual activity was 1 year ago.  Pap hx: reports that she at some point has a cervical biopsy; last pap was in 2023 NILM, HPV-  STI hx: remote hx of Chlamydia    OB Hx:  OB History          3    Para   2    Term   2       0    AB   0    Living   2         SAB        IAB        Ectopic        Multiple        Live Births   3               Past Medical History:   Diagnosis Date    Anemia     Fracture of triquetrum 2020    Obesity 2022    Osteoarthritis 2022    Personal history of colonic polyps     Plantar fasciitis of right foot 2022    Prediabetes 2022    Primary hypertension 2022    Urinary tract infection     Vitamin D deficiency 2022      Past Surgical History:   Procedure Laterality Date     SECTION      , ,     COLONOSCOPY  2022      Current Outpatient Medications   Medication Instructions    amLODIPine (NORVASC) 5 mg, Oral    cyanocobalamin (VITAMIN B-12) 100 mcg, Oral, Daily    ergocalciferol (ERGOCALCIFEROL) 50,000 Units, Oral, Every 7 days    estradioL (ESTRACE) 0.01 % (0.1 mg/gram) vaginal cream Use a pea sized amount  "to the vagina once a night for 14 nights when initiating the medication, then 2-3 times per week.  DO NOT use the applicator.    gabapentin (NEURONTIN) 100 MG capsule TAKE 1 CAPSULE (100 MG TOTAL) BY MOUTH THREE TIMES DAILY.    metFORMIN (GLUCOPHAGE-XR) 500 mg, Oral    multivitamin with minerals tablet 1 tablet, Daily    NON FORMULARY MEDICATION 1 capsule, Oral, Daily, Femdophilus     NON FORMULARY MEDICATION 2 tablets, Oral, Daily, UriCalm     Social History     Tobacco Use    Smoking status: Never    Smokeless tobacco: Never   Substance Use Topics    Alcohol use: Never    Drug use: Never     Family Hx:  Sister: Colon cancer (older)  Mom: Pancreatic cancer    Review of Systems  Pertinent items noted in HPI.    Objective:     Vitals:    07/03/24 0801   BP: 124/84   BP Location: Left arm   Patient Position: Sitting   BP Method: Large (Automatic)   Pulse: 66   Resp: 18   Temp: 98.2 °F (36.8 °C)   TempSrc: Oral   SpO2: 98%   Weight: 104.5 kg (230 lb 6.4 oz)   Height: 5' 5" (1.651 m)     Body mass index is 38.34 kg/m².    Physical Exam:   General: Alert and oriented, in no acute distress  Lungs: Breathing comfortably on room air, no conversational dyspnea  Heart: Regular rate, extremities well perfused  Abdomen: Soft, non-distended, non tender to palpation, no involuntary guarding, no rebound tenderness  Extremities: Atraumatic, non-edematous, no cords or calf tenderness, no significant calf/ankle edema  External genitalia: Normal female genitalia without lesion, discharge or tenderness. Normal appearing urethral meatus. Normal appearing external anus.  Bimanual Exam: Uterus 7 cm in size, anteverted, freely mobile, smooth in contour, no masses. No cervical motion tenderness. No adnexal fullness/tenderness. 2 cm mass palpable at cervical os with thin stalk, freely mobile.   Speculum Exam: Vaginal mucosa pink and moist, without lesion. Scant, white discharge present in vaginal canal. Cervix well visualized, smooth in " contour with no masses or lesions. Os normal in appearance without blood or discharge. 2 cm beef red polypoid structure protruding from cervical os.   Note:  Female nurse chaperone present for entirety of exam.    Relevant Labs:   Lab Results   Component Value Date    WBC 4.66 2024    HGB 11.6 (L) 2024    HCT 36.3 (L) 2024    MCV 84.8 2024     2024     Procedure  EMBx performed and polypectomy: Patient placed in dorsal lithotomy position, sterile speculum inserted. Polypoid structure grasped with ringed forceps and twisted until mass was removed. Cervix prepped with betadine x3. Endometrial pipelle inserted to fundus and uterus sounded to 6 cm. One passes performed with adequate tissue obtained. All instruments removed. Patient tolerated the procedure well. Minimal EBL noted.    Assessment:       49 y.o.  here for abnormal uterine bleeding.  1. Polyp at cervical os  Specimen to Pathology Gynecology and Obstetrics      2. Abnormal uterine bleeding (AUB)  US Transvaginal Non OB    Urinalysis, Reflex to Urine Culture    TSH    CBC Without Differential    Specimen to Pathology Gynecology and Obstetrics      3. Abnormal uterine bleeding          Plan:     Problem List Items Addressed This Visit          Renal/    Polyp at cervical os - Primary    Overview     Physical exam today was remarkable for polypoid lesion protruding from cervical os  -Polypoid lesion was removed without difficulty  -Suspect that patient's bleeding profile is likely due to polypoid lesion         Abnormal uterine bleeding    Overview     Patient with new onset heavy menstrual bleeding and spotting, lasting 1 week  -Previously on Depo Provera with amenorrhea   -Suspect that bleeding is due to AUB-P given exam today however full workup initiated  -TSH, CBC, and UA obtained  -TVUS ordered  -EMB collected today given patient's age and BMI  -Will follow-up via telehealth to discuss test results and assess  patient's bleeding profile          Other Visit Diagnoses       Abnormal uterine bleeding (AUB)              Return to clinic via telehealth    Discussed patient and plan with Dr. Catie Haddad MD  [facility] Gyne[facility], PGY-2  7/3/2024

## 2024-07-04 PROBLEM — N93.9 ABNORMAL UTERINE BLEEDING: Status: ACTIVE | Noted: 2024-07-04

## 2024-07-04 PROBLEM — N84.1 POLYP AT CERVICAL OS: Status: ACTIVE | Noted: 2024-07-04

## 2024-07-15 ENCOUNTER — PATIENT MESSAGE (OUTPATIENT)
Dept: FAMILY MEDICINE | Facility: CLINIC | Age: 49
End: 2024-07-15
Payer: COMMERCIAL

## 2024-07-15 ENCOUNTER — TELEPHONE (OUTPATIENT)
Dept: FAMILY MEDICINE | Facility: CLINIC | Age: 49
End: 2024-07-15
Payer: COMMERCIAL

## 2024-07-15 ENCOUNTER — OFFICE VISIT (OUTPATIENT)
Dept: URGENT CARE | Facility: CLINIC | Age: 49
End: 2024-07-15
Payer: COMMERCIAL

## 2024-07-15 VITALS
RESPIRATION RATE: 18 BRPM | HEIGHT: 67 IN | BODY MASS INDEX: 36.57 KG/M2 | TEMPERATURE: 98 F | OXYGEN SATURATION: 99 % | SYSTOLIC BLOOD PRESSURE: 130 MMHG | DIASTOLIC BLOOD PRESSURE: 88 MMHG | WEIGHT: 233 LBS | HEART RATE: 70 BPM

## 2024-07-15 DIAGNOSIS — R82.90 ABNORMAL URINALYSIS: Primary | ICD-10-CM

## 2024-07-15 DIAGNOSIS — R30.0 BURNING WITH URINATION: Primary | ICD-10-CM

## 2024-07-15 LAB
BILIRUB UR QL STRIP: NEGATIVE
GLUCOSE UR QL STRIP: NEGATIVE
KETONES UR QL STRIP: NEGATIVE
LEUKOCYTE ESTERASE UR QL STRIP: POSITIVE
PH, POC UA: 6
POC BLOOD, URINE: POSITIVE
POC NITRATES, URINE: NEGATIVE
PROT UR QL STRIP: POSITIVE
SP GR UR STRIP: 1.03 (ref 1–1.03)
UROBILINOGEN UR STRIP-ACNC: 0.2 (ref 0.1–1.1)

## 2024-07-15 PROCEDURE — 81003 URINALYSIS AUTO W/O SCOPE: CPT | Mod: PBBFAC | Performed by: STUDENT IN AN ORGANIZED HEALTH CARE EDUCATION/TRAINING PROGRAM

## 2024-07-15 PROCEDURE — 99213 OFFICE O/P EST LOW 20 MIN: CPT | Mod: PBBFAC | Performed by: STUDENT IN AN ORGANIZED HEALTH CARE EDUCATION/TRAINING PROGRAM

## 2024-07-15 PROCEDURE — 99213 OFFICE O/P EST LOW 20 MIN: CPT | Mod: S$PBB,,, | Performed by: STUDENT IN AN ORGANIZED HEALTH CARE EDUCATION/TRAINING PROGRAM

## 2024-07-15 RX ORDER — NITROFURANTOIN 25; 75 MG/1; MG/1
100 CAPSULE ORAL 2 TIMES DAILY
Qty: 14 CAPSULE | Refills: 0 | Status: SHIPPED | OUTPATIENT
Start: 2024-07-15 | End: 2024-07-22

## 2024-07-15 NOTE — PROGRESS NOTES
"Subjective:      Patient ID: Karen Tapia is a 49 y.o. female.    Vitals:  height is 5' 7" (1.702 m) and weight is 105.7 kg (233 lb). Her oral temperature is 98.4 °F (36.9 °C). Her blood pressure is 130/88 and her pulse is 70. Her respiration is 18 and oxygen saturation is 99%.     Chief Complaint: Possible UTI  (Patient reports burning with urination, urine frequency & urgency x 2 weeks )    HPI  Karen Tapia is a 49-year-old female presenting to the urgent care clinic today with UTI symptoms.  Patient complains of urinary frequency, urgency, burning with urination for the past couple of weeks.  Patient has tried taking over-the-counter azo without any relief of her symptoms.  She has increased her water intake without any relief of her symptoms.  This is typical of a urinary tract for the patient.  She last had a E coli urinary tract infection on 06/15/2024 which was pan sensitive.  ROS   Objective:     Physical Exam   Constitutional: She is oriented to person, place, and time. She appears well-developed.   HENT:   Head: Normocephalic and atraumatic.   Ears:   Right Ear: External ear normal.   Left Ear: External ear normal.   Nose: Nose normal. No nasal deformity. No epistaxis.   Mouth/Throat: Oropharynx is clear and moist and mucous membranes are normal.   Eyes: Lids are normal.   Neck: Trachea normal and phonation normal. Neck supple.   Cardiovascular: Normal pulses.   Pulmonary/Chest: Effort normal.   Abdominal: Normal appearance and bowel sounds are normal. She exhibits no distension and no mass. Soft. There is no abdominal tenderness.   Neurological: She is alert and oriented to person, place, and time.   Skin: Skin is warm, dry and intact.   Psychiatric: Her speech is normal and behavior is normal.   Nursing note and vitals reviewed.      Assessment:     1. Burning with urination        Plan:     Patient presents to the urgent care clinic today with UTI symptoms that have been going on for the past " couple of weeks.  Patient with a recent urinary tract infection that was positive for E coli which was pansensitive to all antibiotics.  We will treat patient with Macrobid for 7 days.  Return to clinic/ER going precautions discussed.    Burning with urination  -     POCT Urinalysis, Dipstick, Manual, W/O Scope    Other orders  -     nitrofurantoin, macrocrystal-monohydrate, (MACROBID) 100 MG capsule; Take 1 capsule (100 mg total) by mouth 2 (two) times daily. for 7 days  Dispense: 14 capsule; Refill: 0      This note is dictated using the [employer] Fluency Direct word recognition program. There are word recognition mistakes that are occasionally missed on review.    Sherlyn Herrera MD

## 2024-07-15 NOTE — TELEPHONE ENCOUNTER
----- Message from Ligia English sent at 7/15/2024  2:20 PM CDT -----  Regarding: Med advice  .Who Called: Karen Cammie Duhon    Caller is requesting assistance/information from provider's office.    Symptoms (please be specific): possible UTI   How long has patient had these symptoms:  07/03  List of preferred pharmacies on file (remove unneeded): [unfilled]  If different, enter pharmacy into here including location and phone number: [facility] in [place],    Preferred Method of Contact: Phone Call  Patient's Preferred Phone Number on File: [phone]   Best Call Back Number, if different:  Additional Information: pt states she have an UTI and would like prescription called into the pharmacy for her, please advise

## 2024-07-17 ENCOUNTER — HOSPITAL ENCOUNTER (OUTPATIENT)
Dept: RADIOLOGY | Facility: HOSPITAL | Age: 49
Discharge: HOME | End: 2024-07-17
Payer: COMMERCIAL

## 2024-07-17 DIAGNOSIS — N93.9 ABNORMAL UTERINE BLEEDING (AUB): ICD-10-CM

## 2024-07-17 PROCEDURE — 76830 TRANSVAGINAL US NON-OB: CPT | Mod: TC

## 2024-07-25 ENCOUNTER — TELEPHONE (OUTPATIENT)
Dept: PHARMACY | Facility: CLINIC | Age: 49
End: 2024-07-25
Payer: COMMERCIAL

## 2024-08-14 ENCOUNTER — CLINICAL SUPPORT (OUTPATIENT)
Dept: GYNECOLOGY | Facility: CLINIC | Age: 49
End: 2024-08-14
Payer: COMMERCIAL

## 2024-08-14 DIAGNOSIS — N84.1 POLYP AT CERVICAL OS: ICD-10-CM

## 2024-08-14 DIAGNOSIS — N93.9 ABNORMAL UTERINE BLEEDING: Primary | ICD-10-CM

## 2024-08-14 DIAGNOSIS — R30.0 DYSURIA: ICD-10-CM

## 2024-08-14 DIAGNOSIS — Z87.440 HISTORY OF UTI: ICD-10-CM

## 2024-08-14 RX ORDER — NITROFURANTOIN 25; 75 MG/1; MG/1
100 CAPSULE ORAL 2 TIMES DAILY
Qty: 10 CAPSULE | Refills: 0 | Status: SHIPPED | OUTPATIENT
Start: 2024-08-14 | End: 2024-08-19

## 2024-08-14 NOTE — ASSESSMENT & PLAN NOTE
Abnormal uterine bleeding likely secondary to cervical polyp, which was benign according to pathology. Endometrial biopsy also performed at that time, although contained mostly blood with few endometrial cells. Given risk factors for endometrial hyperplasia/malignancy and unclear menopausal status, counseled her that hysteroscopy D&C would be reasonable, as endometrial sampling by EMB was inadequate. However, with EMS of 4mm and likely source of abnormal bleeding removed (i.e. cervical polyp), can also consider expectant management with low threshold to obtain additional endometrial sampling via hysteroscopy D&C if additional episode of abnormal uterine bleeding. Patient desires expectant management at this time - advised her to notify MD if abnormal uterine bleeding returns. Otherwise, may follow-up for annual WWE in 1 year.

## 2024-08-14 NOTE — PROGRESS NOTES
[facility] Progress Note    Primary Site: [facility]  Patient location: Home  Physician location: In office    Chief Complaint: abnormal uterine bleeding in the setting of cervical polyp    HPI  Karen Tapia joined me via our telemedicine platform. She was last seen on 07/03/24, at which time she underwent a cervical polypectomy for management of abnormal uterine bleeding in the setting of a cervical polyp. She had previously been amenorrheic secondary to Depo Provera use and then had 1 week of heavy vaginal bleeding in 06/2024. She denies vaginal bleeding since the procedure.     Pathology:   1. Endocervical mass, polypectomy:  - Endocervical polyp.    - No evidence of malignancy.     2. Endometrial biopsy:  - Scant fragments of endometrial glands and stroma with predominantly blood.      Imaging:  FINDINGS:  Uterus measures 6.8 x 2.7 x 3.3 cm.  Endometrium measures 0.4 cm.  Small amount of anechoic fluid is within the endometrial canal.  Patient is status post polyp removal on 07/02/2024 per history.     Ovaries are not identified.  No adnexal masses or free fluid.     Impression:     1. Small anechoic area within the endometrial cavity may be postprocedural given recent polypectomy.    I have reviewed family history, social history, medications and allergies as documented in the patient's electronic medical record.    Reports, labs, outside records, and images have been reviewed with pertinent interpretations below. See telemedicine notes records for intervening communications.    Assessment/Plan  Problem List Items Addressed This Visit          Renal/    Polyp at cervical os    Abnormal uterine bleeding - Primary     Abnormal uterine bleeding likely secondary to cervical polyp, which was benign according to pathology. Endometrial biopsy also performed at that time, although contained mostly blood with few endometrial cells. Given risk factors for endometrial hyperplasia/malignancy and unclear  menopausal status, counseled her that hysteroscopy D&C would be reasonable, as endometrial sampling by EMB was inadequate. However, with EMS of 4mm and likely source of abnormal bleeding removed (i.e. cervical polyp), can also consider expectant management with low threshold to obtain additional endometrial sampling via hysteroscopy D&C if additional episode of abnormal uterine bleeding. Patient desires expectant management at this time - advised her to notify MD if abnormal uterine bleeding returns. Otherwise, may follow-up for annual WWE in 1 year.           Other Visit Diagnoses       Dysuria        Relevant Orders    Urinalysis, Reflex to Urine Culture    History of UTI        Relevant Orders    Urinalysis, Reflex to Urine Culture          See correspondence above for plan.   Patient's needs assessed and health education provided. Patient understands risks, benefits, and alternatives of treatment prescribed above. Patient verbalizes understanding and agrees to follow plan.    Patient's identity was confirmed and confidentiality/privacy confirmed prior to visit. I certify that this visit was done via secure two-way transmission with informed consent of the patient and/or guardian.  Each patient to whom I provide medical services by telemedicine is:  (1) informed of the relationship between the physician and patient and the respective role of any other health care provider with respect to management of the patient; and (2) notified that they may decline to receive medical services by telemedicine and may withdraw from such care at any time. Patient verbally consented to receive this service via voice-only telephone call.    Video conferencing was used to conduct this visit.    75% of the time was counseling or coordinating care.    Patient and plan discussed with Dr. Arevalo.    Gisell Saleh MD  [facility], PGY-4

## 2024-10-07 ENCOUNTER — PATIENT OUTREACH (OUTPATIENT)
Facility: CLINIC | Age: 49
End: 2024-10-07
Payer: COMMERCIAL

## 2024-10-07 DIAGNOSIS — R73.03 PREDIABETES: ICD-10-CM

## 2024-10-07 DIAGNOSIS — Z13.220 SCREENING CHOLESTEROL LEVEL: ICD-10-CM

## 2024-10-07 DIAGNOSIS — Z00.00 WELLNESS EXAMINATION: Primary | ICD-10-CM

## 2024-10-07 DIAGNOSIS — I10 PRIMARY HYPERTENSION: Chronic | ICD-10-CM

## 2024-10-07 NOTE — LETTER
AUTHORIZATION FOR RELEASE OF   CONFIDENTIAL INFORMATION        We are seeing Karen Tapia, date of birth 1975, in the clinic at [facility]. Luda Fonseca MD is the patient's PCP. Karen Tapia has an outstanding lab/procedure at the time we reviewed her chart. In order to help keep her health information updated, she has authorized us to request the following medical record(s):                                                      ( x )  PAP SMEAR                                                 Please fax records to [facility], [place], Meighan G, MD,  at [phone] or email to [email or web address].               Patient Name: Karen Tapia  : 1975  Patient Phone #: [phone]

## 2024-10-07 NOTE — Clinical Note
Please review pt for statin. Current dx of Diabetes Mellitus triggers requirement for statin Rx. If patient has statin intolerance, please use any of the following as/if appropriate:  ·        M79.10 Myalgia, unspecified site ·        M60.9 Myositis ·        G72.9 Myopathy ·        G72.0 Drug- induced myopathy · N18.6 ESRD · K74.60 Cirrhosis · Z31. 83 On clomiphene · M62.82 Rhabdomyolysis · Z51.5 Hospice/ Palliative Care *these are some, not all exclusion dx Exclusion must be documented annually as an active problem and dropped on a claim every year (Jan1 - Dec 31) for patient to be and remain excluded.

## 2024-10-07 NOTE — PROGRESS NOTES
Health Maintenance Topic(s) Outreach Outcomes & Actions Taken:    Cervical Cancer Screening - Outreach Outcomes & Actions Taken  : External Records Requested & Care Team Updated if Applicable    Lab(s) - Outreach Outcomes & Actions Taken  : Overdue Lab(s) Ordered    Medication Adherence / Statins - Outreach Outcomes & Actions Taken  : Sent Provider Message to Review to Evaluate Pt for Statin, Add Exclusion Dx Codes, Document Exclusion in Problem List, Change Statin Intensity Level to Moderate or High Intensity if Applicable

## 2024-12-03 ENCOUNTER — OFFICE VISIT (OUTPATIENT)
Dept: PRIMARY CARE CLINIC | Facility: CLINIC | Age: 49
End: 2024-12-03
Payer: COMMERCIAL

## 2024-12-03 VITALS
RESPIRATION RATE: 18 BRPM | SYSTOLIC BLOOD PRESSURE: 124 MMHG | HEIGHT: 67 IN | OXYGEN SATURATION: 98 % | WEIGHT: 227 LBS | BODY MASS INDEX: 35.63 KG/M2 | TEMPERATURE: 97 F | HEART RATE: 82 BPM | DIASTOLIC BLOOD PRESSURE: 70 MMHG

## 2024-12-03 DIAGNOSIS — R73.03 PREDIABETES: ICD-10-CM

## 2024-12-03 DIAGNOSIS — R21 RASH: ICD-10-CM

## 2024-12-03 DIAGNOSIS — I10 ESSENTIAL (PRIMARY) HYPERTENSION: ICD-10-CM

## 2024-12-03 DIAGNOSIS — Z00.00 ENCOUNTER FOR WELLNESS EXAMINATION IN ADULT: Primary | ICD-10-CM

## 2024-12-03 DIAGNOSIS — E55.9 VITAMIN D DEFICIENCY: Chronic | ICD-10-CM

## 2024-12-03 DIAGNOSIS — R30.0 DYSURIA: Primary | ICD-10-CM

## 2024-12-03 DIAGNOSIS — I10 PRIMARY HYPERTENSION: Chronic | ICD-10-CM

## 2024-12-03 DIAGNOSIS — E66.01 SEVERE OBESITY (BMI 35.0-39.9) WITH COMORBIDITY: ICD-10-CM

## 2024-12-03 PROCEDURE — 3008F BODY MASS INDEX DOCD: CPT | Mod: CPTII,,, | Performed by: STUDENT IN AN ORGANIZED HEALTH CARE EDUCATION/TRAINING PROGRAM

## 2024-12-03 PROCEDURE — 99214 OFFICE O/P EST MOD 30 MIN: CPT | Mod: ,,, | Performed by: STUDENT IN AN ORGANIZED HEALTH CARE EDUCATION/TRAINING PROGRAM

## 2024-12-03 PROCEDURE — 87086 URINE CULTURE/COLONY COUNT: CPT | Performed by: STUDENT IN AN ORGANIZED HEALTH CARE EDUCATION/TRAINING PROGRAM

## 2024-12-03 PROCEDURE — 3074F SYST BP LT 130 MM HG: CPT | Mod: CPTII,,, | Performed by: STUDENT IN AN ORGANIZED HEALTH CARE EDUCATION/TRAINING PROGRAM

## 2024-12-03 PROCEDURE — 1160F RVW MEDS BY RX/DR IN RCRD: CPT | Mod: CPTII,,, | Performed by: STUDENT IN AN ORGANIZED HEALTH CARE EDUCATION/TRAINING PROGRAM

## 2024-12-03 PROCEDURE — 1159F MED LIST DOCD IN RCRD: CPT | Mod: CPTII,,, | Performed by: STUDENT IN AN ORGANIZED HEALTH CARE EDUCATION/TRAINING PROGRAM

## 2024-12-03 PROCEDURE — 3078F DIAST BP <80 MM HG: CPT | Mod: CPTII,,, | Performed by: STUDENT IN AN ORGANIZED HEALTH CARE EDUCATION/TRAINING PROGRAM

## 2024-12-03 RX ORDER — METFORMIN HYDROCHLORIDE 500 MG/1
500 TABLET, EXTENDED RELEASE ORAL DAILY
Qty: 90 TABLET | Refills: 3 | Status: SHIPPED | OUTPATIENT
Start: 2024-12-03 | End: 2025-12-03

## 2024-12-03 RX ORDER — AMLODIPINE BESYLATE 5 MG/1
5 TABLET ORAL DAILY
Qty: 90 TABLET | Refills: 3 | Status: SHIPPED | OUTPATIENT
Start: 2024-12-03 | End: 2025-12-03

## 2024-12-03 RX ORDER — CIPROFLOXACIN 500 MG/1
500 TABLET ORAL EVERY 12 HOURS
Qty: 14 TABLET | Refills: 0 | Status: SHIPPED | OUTPATIENT
Start: 2024-12-03

## 2024-12-03 NOTE — PROGRESS NOTES
Chief Complaint  Chief Complaint   Patient presents with    [facility]    Urinary Tract Infection    Skin discoloration lower left leg       HPI  Karen Tapia is a 49 y.o. female with medical diagnoses as listed in the medical history and problem list that presents to [facility]. Histories reviewed. Refills needed for pre-diabetes and HTN  Patient also has history of recurrent UTI following up with UroGyn and she is having an active UTI with dysuria and abdominal fullness x 3 days   Also has a rash on left lower leg x 1 week that is lacy in appearance but does not bother patient at all.     Health Maintenance         Date Due Completion Date    Foot Exam Never done ---    Low Dose Statin Never done ---    Diabetes Urine Screening 03/12/2022 3/12/2021    Shingles Vaccine (1 of 2) Never done ---    Hemoglobin A1c 04/30/2024 10/30/2023    Lipid Panel 10/30/2024 10/30/2023    TETANUS VACCINE 12/03/2025 (Originally 2/10/2015) 2/10/2005    Pneumococcal Vaccines (Age 0-64) (1 of 2 - PCV) 12/03/2025 (Originally 2/1/1979) ---    Mammogram 05/09/2025 5/9/2024    Colorectal Cancer Screening 09/01/2027 9/1/2022    Cervical Cancer Screening 12/28/2027 12/28/2022    RSV Vaccine (Age 60+ and Pregnant patients) (1 - 1-dose 75+ series) 02/01/2048 ---            ALLERGIES AND MEDICATIONS: updated and reviewed.  Review of patient's allergies indicates:   Allergen Reactions    Codeine Rash     Current Outpatient Medications   Medication Sig Dispense Refill    cyanocobalamin (VITAMIN B-12) 1000 MCG tablet Take 100 mcg by mouth once daily.      estradioL (ESTRACE) 0.01 % (0.1 mg/gram) vaginal cream Use a pea sized amount to the vagina once a night for 14 nights when initiating the medication, then 2-3 times per week.  DO NOT use the applicator. 42.5 g 10    methenamine/sodium salicylate (AZO URINARY TRACT DEFENSE ORAL) Take by mouth daily as needed (UTI's).      NON FORMULARY MEDICATION Take 1 capsule by mouth Daily.  "Femdophilus-urinary probiotic      NON FORMULARY MEDICATION Take 2 tablets by mouth once daily. UriCalm-for urinary health      amLODIPine (NORVASC) 5 MG tablet Take 1 tablet (5 mg total) by mouth once daily. 90 tablet 3    ciprofloxacin HCl (CIPRO) 500 MG tablet Take 1 tablet (500 mg total) by mouth every 12 (twelve) hours. 14 tablet 0    ergocalciferol (ERGOCALCIFEROL) 50,000 unit Cap Take 50,000 Units by mouth every 7 days.      metFORMIN (GLUCOPHAGE-XR) 500 MG ER 24hr tablet Take 1 tablet (500 mg total) by mouth Daily. 90 tablet 3    multivitamin with minerals tablet Take 1 tablet by mouth once daily. Iron supplement PRN       No current facility-administered medications for this visit.       Histories are reviewed and updated as appropriate     Review of Systems  Comprehensive review of system performed- negative except noted in HPI       Objective:   Vitals:    12/03/24 1255   BP: 124/70   BP Location: Left arm   Patient Position: Sitting   Pulse: 82   Resp: 18   Temp: 97.3 °F (36.3 °C)   TempSrc: Oral   SpO2: 98%   Weight: 103 kg (227 lb)   Height: 5' 7" (1.702 m)    Body mass index is 35.55 kg/m².  Physical Exam  Vitals and nursing note reviewed.   Constitutional:       General: She is not in acute distress.     Appearance: Normal appearance.   HENT:      Head: Normocephalic and atraumatic.      Mouth/Throat:      Mouth: Mucous membranes are moist.      Pharynx: Oropharynx is clear.   Eyes:      Extraocular Movements: Extraocular movements intact.      Conjunctiva/sclera: Conjunctivae normal.   Cardiovascular:      Rate and Rhythm: Normal rate and regular rhythm.   Pulmonary:      Effort: Pulmonary effort is normal.      Breath sounds: Normal breath sounds.   Abdominal:      Palpations: Abdomen is soft.   Musculoskeletal:         General: No swelling or deformity. Normal range of motion.   Skin:     General: Skin is warm and dry.      Comments: Blanching erythematous spider web rash right inferior to left " knee wrapping around the calf. No edema or tenderness    Neurological:      General: No focal deficit present.      Mental Status: She is alert and oriented to person, place, and time. Mental status is at baseline.   Psychiatric:         Mood and Affect: Mood normal.         Behavior: Behavior normal.           Assessment & Plan  1. Dysuria  -     Stay hydrated.   -     ciprofloxacin HCl (CIPRO) 500 MG tablet; Take 1 tablet (500 mg total) by mouth every 12 (twelve) hours.  Dispense: 14 tablet; Refill: 0  -     Urine culture    2. Essential (primary) hypertension  -   Stable  -   Refill  amLODIPine (NORVASC) 5 MG tablet; Take 1 tablet (5 mg total) by mouth once daily.  Dispense: 90 tablet; Refill: 3    3. Prediabetes  -     metFORMIN (GLUCOPHAGE-XR) 500 MG ER 24hr tablet; Take 1 tablet (500 mg total) by mouth Daily.  Dispense: 90 tablet; Refill: 3    4. Rash  Likely irritation   Monitor     RTC in 1-2 months for wellness          No follow-ups on file.

## 2024-12-06 LAB — BACTERIA UR CULT: NORMAL

## 2024-12-21 ENCOUNTER — TELEPHONE (OUTPATIENT)
Dept: PHARMACY | Facility: CLINIC | Age: 49
End: 2024-12-21
Payer: COMMERCIAL

## 2024-12-21 NOTE — TELEPHONE ENCOUNTER
[facility]/Population Health Chart Review & Patient Outreach Details For Medication Adherence Project    Reason for Outreach Encounter: 3rd Party payor non-compliance report ([employer], [employer], C, etc)  2.  Patient Outreach Method: Reviewed patient chart   3.   Medication in question:    Diabetes Medications               metFORMIN (GLUCOPHAGE-XR) 500 MG ER 24hr tablet Take 1 tablet (500 mg total) by mouth Daily.                   last filled  12/4/24 for 90 day supply      4.  Reviewed and or Updates Made To: Patient Chart  5. Outreach Outcomes and/or actions taken: Patient filled medication and is on track to be adherent  Additional Notes:

## 2025-01-24 ENCOUNTER — OFFICE VISIT (OUTPATIENT)
Dept: PRIMARY CARE CLINIC | Facility: CLINIC | Age: 50
End: 2025-01-24
Payer: COMMERCIAL

## 2025-01-24 VITALS
DIASTOLIC BLOOD PRESSURE: 80 MMHG | SYSTOLIC BLOOD PRESSURE: 118 MMHG | OXYGEN SATURATION: 99 % | BODY MASS INDEX: 34.06 KG/M2 | TEMPERATURE: 99 F | RESPIRATION RATE: 18 BRPM | HEIGHT: 67 IN | HEART RATE: 90 BPM | WEIGHT: 217 LBS

## 2025-01-24 DIAGNOSIS — R73.03 PREDIABETES: ICD-10-CM

## 2025-01-24 DIAGNOSIS — E55.9 VITAMIN D DEFICIENCY: ICD-10-CM

## 2025-01-24 DIAGNOSIS — Z00.00 ENCOUNTER FOR WELLNESS EXAMINATION IN ADULT: Primary | ICD-10-CM

## 2025-01-24 DIAGNOSIS — E66.01 SEVERE OBESITY (BMI 35.0-39.9) WITH COMORBIDITY: ICD-10-CM

## 2025-01-24 DIAGNOSIS — J01.10 ACUTE NON-RECURRENT FRONTAL SINUSITIS: ICD-10-CM

## 2025-01-24 DIAGNOSIS — I10 PRIMARY HYPERTENSION: ICD-10-CM

## 2025-01-24 LAB
25(OH)D3+25(OH)D2 SERPL-MCNC: 67 NG/ML (ref 30–80)
ALBUMIN SERPL-MCNC: 3.8 G/DL (ref 3.5–5)
ALBUMIN/GLOB SERPL: 1.1 RATIO (ref 1.1–2)
ALP SERPL-CCNC: 70 UNIT/L (ref 40–150)
ALT SERPL-CCNC: 18 UNIT/L (ref 0–55)
ANION GAP SERPL CALC-SCNC: 9 MEQ/L
AST SERPL-CCNC: 17 UNIT/L (ref 5–34)
BASOPHILS # BLD AUTO: 0.03 X10(3)/MCL
BASOPHILS NFR BLD AUTO: 0.7 %
BILIRUB SERPL-MCNC: 0.4 MG/DL
BUN SERPL-MCNC: 8.4 MG/DL (ref 9.8–20.1)
CALCIUM SERPL-MCNC: 9.4 MG/DL (ref 8.4–10.2)
CHLORIDE SERPL-SCNC: 108 MMOL/L (ref 98–107)
CHOLEST SERPL-MCNC: 143 MG/DL
CHOLEST/HDLC SERPL: 5 {RATIO} (ref 0–5)
CO2 SERPL-SCNC: 24 MMOL/L (ref 22–29)
CREAT SERPL-MCNC: 0.87 MG/DL (ref 0.55–1.02)
CREAT UR-MCNC: 374.6 MG/DL (ref 45–106)
CREAT/UREA NIT SERPL: 10
EOSINOPHIL # BLD AUTO: 0.06 X10(3)/MCL (ref 0–0.9)
EOSINOPHIL NFR BLD AUTO: 1.3 %
ERYTHROCYTE [DISTWIDTH] IN BLOOD BY AUTOMATED COUNT: 17.1 % (ref 11.5–17)
EST. AVERAGE GLUCOSE BLD GHB EST-MCNC: 125.5 MG/DL
GFR SERPLBLD CREATININE-BSD FMLA CKD-EPI: >60 ML/MIN/1.73/M2
GLOBULIN SER-MCNC: 3.4 GM/DL (ref 2.4–3.5)
GLUCOSE SERPL-MCNC: 110 MG/DL (ref 74–100)
HBA1C MFR BLD: 6 %
HCT VFR BLD AUTO: 36.2 % (ref 37–47)
HDLC SERPL-MCNC: 30 MG/DL (ref 35–60)
HGB BLD-MCNC: 11.3 G/DL (ref 12–16)
IMM GRANULOCYTES # BLD AUTO: 0.02 X10(3)/MCL (ref 0–0.04)
IMM GRANULOCYTES NFR BLD AUTO: 0.4 %
LDLC SERPL CALC-MCNC: 92 MG/DL (ref 50–140)
LYMPHOCYTES # BLD AUTO: 1.29 X10(3)/MCL (ref 0.6–4.6)
LYMPHOCYTES NFR BLD AUTO: 28.5 %
MCH RBC QN AUTO: 24.7 PG (ref 27–31)
MCHC RBC AUTO-ENTMCNC: 31.2 G/DL (ref 33–36)
MCV RBC AUTO: 79 FL (ref 80–94)
MICROALBUMIN UR-MCNC: 24 UG/ML
MICROALBUMIN/CREAT RATIO PNL UR: 6.4 MG/GM CR (ref 0–30)
MONOCYTES # BLD AUTO: 0.66 X10(3)/MCL (ref 0.1–1.3)
MONOCYTES NFR BLD AUTO: 14.6 %
NEUTROPHILS # BLD AUTO: 2.46 X10(3)/MCL (ref 2.1–9.2)
NEUTROPHILS NFR BLD AUTO: 54.5 %
NRBC BLD AUTO-RTO: 0 %
PLATELET # BLD AUTO: 256 X10(3)/MCL (ref 130–400)
PMV BLD AUTO: 10.7 FL (ref 7.4–10.4)
POTASSIUM SERPL-SCNC: 4 MMOL/L (ref 3.5–5.1)
PROT SERPL-MCNC: 7.2 GM/DL (ref 6.4–8.3)
RBC # BLD AUTO: 4.58 X10(6)/MCL (ref 4.2–5.4)
SODIUM SERPL-SCNC: 141 MMOL/L (ref 136–145)
TRIGL SERPL-MCNC: 106 MG/DL (ref 37–140)
TSH SERPL-ACNC: 0.67 UIU/ML (ref 0.35–4.94)
VLDLC SERPL CALC-MCNC: 21 MG/DL
WBC # BLD AUTO: 4.52 X10(3)/MCL (ref 4.5–11.5)

## 2025-01-24 PROCEDURE — 85025 COMPLETE CBC W/AUTO DIFF WBC: CPT | Performed by: STUDENT IN AN ORGANIZED HEALTH CARE EDUCATION/TRAINING PROGRAM

## 2025-01-24 PROCEDURE — 99396 PREV VISIT EST AGE 40-64: CPT | Mod: ,,, | Performed by: STUDENT IN AN ORGANIZED HEALTH CARE EDUCATION/TRAINING PROGRAM

## 2025-01-24 PROCEDURE — 84443 ASSAY THYROID STIM HORMONE: CPT | Performed by: STUDENT IN AN ORGANIZED HEALTH CARE EDUCATION/TRAINING PROGRAM

## 2025-01-24 PROCEDURE — 86803 HEPATITIS C AB TEST: CPT | Performed by: STUDENT IN AN ORGANIZED HEALTH CARE EDUCATION/TRAINING PROGRAM

## 2025-01-24 PROCEDURE — 36415 COLL VENOUS BLD VENIPUNCTURE: CPT | Performed by: STUDENT IN AN ORGANIZED HEALTH CARE EDUCATION/TRAINING PROGRAM

## 2025-01-24 PROCEDURE — 3008F BODY MASS INDEX DOCD: CPT | Mod: CPTII,,, | Performed by: STUDENT IN AN ORGANIZED HEALTH CARE EDUCATION/TRAINING PROGRAM

## 2025-01-24 PROCEDURE — 99214 OFFICE O/P EST MOD 30 MIN: CPT | Mod: 25,,, | Performed by: STUDENT IN AN ORGANIZED HEALTH CARE EDUCATION/TRAINING PROGRAM

## 2025-01-24 PROCEDURE — 3079F DIAST BP 80-89 MM HG: CPT | Mod: CPTII,,, | Performed by: STUDENT IN AN ORGANIZED HEALTH CARE EDUCATION/TRAINING PROGRAM

## 2025-01-24 PROCEDURE — 1159F MED LIST DOCD IN RCRD: CPT | Mod: CPTII,,, | Performed by: STUDENT IN AN ORGANIZED HEALTH CARE EDUCATION/TRAINING PROGRAM

## 2025-01-24 PROCEDURE — 82043 UR ALBUMIN QUANTITATIVE: CPT | Performed by: STUDENT IN AN ORGANIZED HEALTH CARE EDUCATION/TRAINING PROGRAM

## 2025-01-24 PROCEDURE — 3074F SYST BP LT 130 MM HG: CPT | Mod: CPTII,,, | Performed by: STUDENT IN AN ORGANIZED HEALTH CARE EDUCATION/TRAINING PROGRAM

## 2025-01-24 PROCEDURE — 82306 VITAMIN D 25 HYDROXY: CPT | Performed by: STUDENT IN AN ORGANIZED HEALTH CARE EDUCATION/TRAINING PROGRAM

## 2025-01-24 PROCEDURE — 80061 LIPID PANEL: CPT | Performed by: STUDENT IN AN ORGANIZED HEALTH CARE EDUCATION/TRAINING PROGRAM

## 2025-01-24 PROCEDURE — 83036 HEMOGLOBIN GLYCOSYLATED A1C: CPT | Performed by: STUDENT IN AN ORGANIZED HEALTH CARE EDUCATION/TRAINING PROGRAM

## 2025-01-24 PROCEDURE — 80053 COMPREHEN METABOLIC PANEL: CPT | Performed by: STUDENT IN AN ORGANIZED HEALTH CARE EDUCATION/TRAINING PROGRAM

## 2025-01-24 PROCEDURE — 1160F RVW MEDS BY RX/DR IN RCRD: CPT | Mod: CPTII,,, | Performed by: STUDENT IN AN ORGANIZED HEALTH CARE EDUCATION/TRAINING PROGRAM

## 2025-01-24 RX ORDER — METHYLPREDNISOLONE 4 MG/1
TABLET ORAL
Qty: 21 EACH | Refills: 0 | Status: SHIPPED | OUTPATIENT
Start: 2025-01-24 | End: 2025-02-14

## 2025-01-24 RX ORDER — AMOXICILLIN AND CLAVULANATE POTASSIUM 875; 125 MG/1; MG/1
1 TABLET, FILM COATED ORAL EVERY 12 HOURS
Qty: 14 TABLET | Refills: 0 | Status: SHIPPED | OUTPATIENT
Start: 2025-01-24

## 2025-01-24 NOTE — PROGRESS NOTES
Chief Complaint  Chief Complaint   Patient presents with    Annual Exam     Mammogram 5/2024, pap smear 12/2022 due 2027,colonoscopy 2022 due 2027    Sinus Problem     Symptoms started about 1 week ago     Cough    Fever       HPI  Karen Tapia is a 49 y.o. female with medical diagnoses as listed in the medical history and problem list that presents for Patient presents to the clinic today for wellness examination.  Current and past medical history reviewed.  Pertinent family and social history reviewed.    CRC screening:  UTD  Cervical cancer screening:  If applicable, cervical cancer screening reviewed  Breast cancer screening:  If applicable, breast cancer screening reviewed    Vaccinations due. vaccination status reviewed, if due and if desired, vaccinations provided and given     Patient also complains about severe sinus congestion and pressure headache x 1 weeks with fever and sweating over night. (+) significant mucus drip/ Denies sore throat or earache /  Denies sick contact.         Health Maintenance         Date Due Completion Date    Hemoglobin A1c (Prediabetes) 10/30/2024 10/30/2023    TETANUS VACCINE 12/03/2025 (Originally 2/10/2015) 2/10/2005    Shingles Vaccine (1 of 2) 01/24/2026 (Originally 2/1/2023) ---    Pneumococcal Vaccines (Age 50+) (1 of 1 - PCV) 01/24/2026 (Originally 2/1/2023) ---    Mammogram 05/09/2025 5/9/2024    Colorectal Cancer Screening 09/01/2027 9/1/2022    Cervical Cancer Screening 12/28/2027 12/28/2022    Lipid Panel 10/30/2028 10/30/2023    RSV Vaccine (Age 60+ and Pregnant patients) (1 - 1-dose 75+ series) 02/01/2048 ---            ALLERGIES AND MEDICATIONS: updated and reviewed.  Review of patient's allergies indicates:   Allergen Reactions    Codeine Rash     Current Outpatient Medications   Medication Sig Dispense Refill    amLODIPine (NORVASC) 5 MG tablet Take 1 tablet (5 mg total) by mouth once daily. 90 tablet 3    cyanocobalamin (VITAMIN B-12) 1000 MCG tablet Take  "100 mcg by mouth once daily.      ergocalciferol (ERGOCALCIFEROL) 50,000 unit Cap Take 50,000 Units by mouth every 7 days.      estradioL (ESTRACE) 0.01 % (0.1 mg/gram) vaginal cream Use a pea sized amount to the vagina once a night for 14 nights when initiating the medication, then 2-3 times per week.  DO NOT use the applicator. 42.5 g 10    metFORMIN (GLUCOPHAGE-XR) 500 MG ER 24hr tablet Take 1 tablet (500 mg total) by mouth Daily. 90 tablet 3    methenamine/sodium salicylate (AZO URINARY TRACT DEFENSE ORAL) Take by mouth daily as needed (UTI's).      multivitamin with minerals tablet Take 1 tablet by mouth once daily. Iron supplement PRN      NON FORMULARY MEDICATION Take 1 capsule by mouth Daily. Femdophilus-urinary probiotic      NON FORMULARY MEDICATION Take 2 tablets by mouth once daily. UriCalm-for urinary health      amoxicillin-clavulanate 875-125mg (AUGMENTIN) 875-125 mg per tablet Take 1 tablet by mouth every 12 (twelve) hours. 14 tablet 0    methylPREDNISolone (MEDROL DOSEPACK) 4 mg tablet use as directed 21 each 0     No current facility-administered medications for this visit.       Histories are reviewed and updated as appropriate     Review of Systems  Comprehensive review of system performed- negative except noted in HPI       Objective:   Vitals:    01/24/25 1033   BP: 118/80   BP Location: Left arm   Patient Position: Sitting   Pulse: 90   Resp: 18   Temp: 99.4 °F (37.4 °C)   TempSrc: Oral   SpO2: 99%   Weight: 98.4 kg (217 lb)   Height: 5' 7" (1.702 m)    Body mass index is 33.99 kg/m².  Physical Exam  Vitals and nursing note reviewed.   Constitutional:       General: She is not in acute distress.     Appearance: Normal appearance.   HENT:      Head: Normocephalic and atraumatic.      Nose: No congestion or rhinorrhea.      Mouth/Throat:      Mouth: Mucous membranes are moist.      Pharynx: Posterior oropharyngeal erythema present.   Eyes:      Extraocular Movements: Extraocular movements " intact.      Conjunctiva/sclera: Conjunctivae normal.   Cardiovascular:      Rate and Rhythm: Normal rate and regular rhythm.   Pulmonary:      Effort: Pulmonary effort is normal.      Breath sounds: Normal breath sounds.   Abdominal:      Palpations: Abdomen is soft.   Musculoskeletal:         General: No swelling or deformity. Normal range of motion.   Skin:     General: Skin is warm and dry.   Neurological:      General: No focal deficit present.      Mental Status: She is alert and oriented to person, place, and time. Mental status is at baseline.   Psychiatric:         Mood and Affect: Mood normal.         Behavior: Behavior normal.           Assessment & Plan  1. Encounter for wellness examination in adult  Overall health status was reviewed   Good health habits reinforced   Labs pending   Appropriate recommendations and preventative care medical information provided, with annual wellness exam encouraged.       2. Vitamin D deficiency  -     Vitamin D; Future; Expected date: 01/24/2025    3. Primary hypertension  Stable on amlodipine 5mg daily   Blood pressure goal <140/90  recommend DASH diet, record BP at home daily and bring log to next office visit to assure that home cuff is calibrated at minimum every 12 months, continue current medication regimen.      4. Prediabetes  Pending A1C    5. Acute non-recurrent frontal sinusitis  -     amoxicillin-clavulanate 875-125mg (AUGMENTIN) 875-125 mg per tablet; Take 1 tablet by mouth every 12 (twelve) hours.  Dispense: 14 tablet; Refill: 0  -     methylPREDNISolone (MEDROL DOSEPACK) 4 mg tablet; use as directed  Dispense: 21 each; Refill: 0  Add Flonase daily at bedtim  Side effects discussed with patient.   Complete the whole course of antibiotic     RTC in 6 months for follow up

## 2025-01-25 LAB — HCV AB SERPL QL IA: NONREACTIVE

## 2025-02-03 ENCOUNTER — OFFICE VISIT (OUTPATIENT)
Dept: PRIMARY CARE CLINIC | Facility: CLINIC | Age: 50
End: 2025-02-03
Payer: COMMERCIAL

## 2025-02-03 VITALS
SYSTOLIC BLOOD PRESSURE: 136 MMHG | HEIGHT: 67 IN | DIASTOLIC BLOOD PRESSURE: 80 MMHG | OXYGEN SATURATION: 97 % | BODY MASS INDEX: 33.99 KG/M2 | HEART RATE: 73 BPM | TEMPERATURE: 98 F | RESPIRATION RATE: 18 BRPM

## 2025-02-03 DIAGNOSIS — Z11.3 SCREEN FOR STD (SEXUALLY TRANSMITTED DISEASE): ICD-10-CM

## 2025-02-03 DIAGNOSIS — R21 SKIN RASH: Primary | ICD-10-CM

## 2025-02-03 LAB
C TRACH DNA SPEC QL NAA+PROBE: NOT DETECTED
N GONORRHOEA DNA SPEC QL NAA+PROBE: NOT DETECTED
SOURCE (OHS): NORMAL
T PALLIDUM AB SER QL: NONREACTIVE

## 2025-02-03 PROCEDURE — 3079F DIAST BP 80-89 MM HG: CPT | Mod: CPTII,,, | Performed by: STUDENT IN AN ORGANIZED HEALTH CARE EDUCATION/TRAINING PROGRAM

## 2025-02-03 PROCEDURE — 87591 N.GONORRHOEAE DNA AMP PROB: CPT | Performed by: STUDENT IN AN ORGANIZED HEALTH CARE EDUCATION/TRAINING PROGRAM

## 2025-02-03 PROCEDURE — 36415 COLL VENOUS BLD VENIPUNCTURE: CPT | Mod: ,,, | Performed by: STUDENT IN AN ORGANIZED HEALTH CARE EDUCATION/TRAINING PROGRAM

## 2025-02-03 PROCEDURE — 3075F SYST BP GE 130 - 139MM HG: CPT | Mod: CPTII,,, | Performed by: STUDENT IN AN ORGANIZED HEALTH CARE EDUCATION/TRAINING PROGRAM

## 2025-02-03 PROCEDURE — 86780 TREPONEMA PALLIDUM: CPT | Performed by: STUDENT IN AN ORGANIZED HEALTH CARE EDUCATION/TRAINING PROGRAM

## 2025-02-03 PROCEDURE — 86704 HEP B CORE ANTIBODY TOTAL: CPT | Performed by: STUDENT IN AN ORGANIZED HEALTH CARE EDUCATION/TRAINING PROGRAM

## 2025-02-03 PROCEDURE — 3066F NEPHROPATHY DOC TX: CPT | Mod: CPTII,,, | Performed by: STUDENT IN AN ORGANIZED HEALTH CARE EDUCATION/TRAINING PROGRAM

## 2025-02-03 PROCEDURE — 87389 HIV-1 AG W/HIV-1&-2 AB AG IA: CPT | Performed by: STUDENT IN AN ORGANIZED HEALTH CARE EDUCATION/TRAINING PROGRAM

## 2025-02-03 PROCEDURE — 3008F BODY MASS INDEX DOCD: CPT | Mod: CPTII,,, | Performed by: STUDENT IN AN ORGANIZED HEALTH CARE EDUCATION/TRAINING PROGRAM

## 2025-02-03 PROCEDURE — 36415 COLL VENOUS BLD VENIPUNCTURE: CPT | Performed by: STUDENT IN AN ORGANIZED HEALTH CARE EDUCATION/TRAINING PROGRAM

## 2025-02-03 PROCEDURE — 1160F RVW MEDS BY RX/DR IN RCRD: CPT | Mod: CPTII,,, | Performed by: STUDENT IN AN ORGANIZED HEALTH CARE EDUCATION/TRAINING PROGRAM

## 2025-02-03 PROCEDURE — 1159F MED LIST DOCD IN RCRD: CPT | Mod: CPTII,,, | Performed by: STUDENT IN AN ORGANIZED HEALTH CARE EDUCATION/TRAINING PROGRAM

## 2025-02-03 PROCEDURE — 99214 OFFICE O/P EST MOD 30 MIN: CPT | Mod: ,,, | Performed by: STUDENT IN AN ORGANIZED HEALTH CARE EDUCATION/TRAINING PROGRAM

## 2025-02-03 PROCEDURE — 3044F HG A1C LEVEL LT 7.0%: CPT | Mod: CPTII,,, | Performed by: STUDENT IN AN ORGANIZED HEALTH CARE EDUCATION/TRAINING PROGRAM

## 2025-02-03 PROCEDURE — 87340 HEPATITIS B SURFACE AG IA: CPT | Performed by: STUDENT IN AN ORGANIZED HEALTH CARE EDUCATION/TRAINING PROGRAM

## 2025-02-03 PROCEDURE — 3061F NEG MICROALBUMINURIA REV: CPT | Mod: CPTII,,, | Performed by: STUDENT IN AN ORGANIZED HEALTH CARE EDUCATION/TRAINING PROGRAM

## 2025-02-03 NOTE — PROGRESS NOTES
Chief Complaint  Chief Complaint   Patient presents with    Rash     Rash on arms, legs, abdomen and back. Rash happens frequently over the last 10 years. She has not had any allergy testing in the past     Left lower leg, circulation    STI Testing     Request STI testing, no exposure, would like to be screened        HPI  Karen Tapia is a 50 y.o. female with medical diagnoses as listed in the medical history and problem list that presents to clinic concerning of a rash flare up. Patient reports to have the rash on and off for over 10 years without obvious trigger. (+) itchy at times. She can only use Dove sensitive product to control it somewhat   Also concerns about circulation of her leg- no swelling or pain   Patient also desires to be tested for STIs, no exposure or risk    Health Maintenance         Date Due Completion Date    TETANUS VACCINE 12/03/2025 (Originally 2/10/2015) 2/10/2005    Shingles Vaccine (1 of 2) 01/24/2026 (Originally 2/1/2023) ---    Pneumococcal Vaccines (Age 50+) (1 of 1 - PCV) 01/24/2026 (Originally 2/1/2023) ---    Mammogram 05/09/2025 5/9/2024    Hemoglobin A1c (Prediabetes) 01/24/2026 1/24/2025    Colorectal Cancer Screening 09/01/2027 9/1/2022    Cervical Cancer Screening 12/28/2027 12/28/2022    Lipid Panel 01/24/2030 1/24/2025    RSV Vaccine (Age 60+ and Pregnant patients) (1 - 1-dose 75+ series) 02/01/2048 ---            ALLERGIES AND MEDICATIONS: updated and reviewed.  Review of patient's allergies indicates:   Allergen Reactions    Codeine Rash     Current Outpatient Medications   Medication Sig Dispense Refill    amLODIPine (NORVASC) 5 MG tablet Take 1 tablet (5 mg total) by mouth once daily. 90 tablet 3    cyanocobalamin (VITAMIN B-12) 1000 MCG tablet Take 100 mcg by mouth once daily.      ergocalciferol (ERGOCALCIFEROL) 50,000 unit Cap Take 50,000 Units by mouth every 7 days.      estradioL (ESTRACE) 0.01 % (0.1 mg/gram) vaginal cream Use a pea sized amount to the  "vagina once a night for 14 nights when initiating the medication, then 2-3 times per week.  DO NOT use the applicator. 42.5 g 10    metFORMIN (GLUCOPHAGE-XR) 500 MG ER 24hr tablet Take 1 tablet (500 mg total) by mouth Daily. 90 tablet 3    methenamine/sodium salicylate (AZO URINARY TRACT DEFENSE ORAL) Take by mouth daily as needed (UTI's).      methylPREDNISolone (MEDROL DOSEPACK) 4 mg tablet use as directed 21 each 0    multivitamin with minerals tablet Take 1 tablet by mouth once daily. Iron supplement PRN      NON FORMULARY MEDICATION Take 1 capsule by mouth Daily. Femdophilus-urinary probiotic      NON FORMULARY MEDICATION Take 2 tablets by mouth once daily. UriCalm-for urinary health      amoxicillin-clavulanate 875-125mg (AUGMENTIN) 875-125 mg per tablet Take 1 tablet by mouth every 12 (twelve) hours. 14 tablet 0     No current facility-administered medications for this visit.       Histories are reviewed and updated as appropriate     Review of Systems  Comprehensive review of system performed- negative except noted in HPI       Objective:   Vitals:    02/03/25 1454   BP: 136/80   BP Location: Left arm   Patient Position: Sitting   Pulse: 73   Resp: 18   Temp: 98.3 °F (36.8 °C)   TempSrc: Oral   SpO2: 97%   Height: 5' 7" (1.702 m)    Body mass index is 33.99 kg/m².  Physical Exam  Vitals and nursing note reviewed.   Constitutional:       Appearance: Normal appearance. She is normal weight.   HENT:      Head: Normocephalic.   Eyes:      Extraocular Movements: Extraocular movements intact.   Pulmonary:      Effort: Pulmonary effort is normal.   Musculoskeletal:      Cervical back: Normal range of motion.   Skin:     Comments: Confluent flat erythematous rash from mid abdomen to upper thigh. No skin breakdown.    Neurological:      General: No focal deficit present.      Mental Status: She is alert and oriented to person, place, and time. Mental status is at baseline.   Psychiatric:         Mood and Affect: Mood " normal.         Behavior: Behavior normal.           Assessment & Plan  1. Skin rash  -     Ambulatory referral/consult to Dermatology; Future; Expected date: 02/10/2025  -    May need allergy testing    2. Screen for STD (sexually transmitted disease)  -     HIV 1/2 Ag/Ab (4th Gen); Future; Expected date: 02/03/2025  -     Chlamydia/GC, PCR; Future; Expected date: 02/03/2025  -     Cancel: SYPHILIS ANTIBODY (WITH REFLEX RPR); Future; Expected date: 02/03/2025  -     Cancel: Hepatitis B Surface Antigen; Future; Expected date: 02/03/2025  -     Cancel: Hepatitis B Core Antibody, Total; Future; Expected date: 02/03/2025  -     Hepatitis B Surface Antigen; Future; Expected date: 02/03/2025  -     Hepatitis B Core Antibody, Total; Future; Expected date: 02/03/2025  -     SYPHILIS ANTIBODY (WITH REFLEX RPR); Future; Expected date: 02/03/2025           RTC as scheduled

## 2025-02-04 LAB
HBV CORE AB SERPL QL IA: NONREACTIVE
HBV SURFACE AG SERPL QL IA: NONREACTIVE
HIV 1+2 AB+HIV1 P24 AG SERPL QL IA: NONREACTIVE

## 2025-02-19 ENCOUNTER — HOSPITAL ENCOUNTER (EMERGENCY)
Facility: HOSPITAL | Age: 50
Discharge: HOME | End: 2025-02-19
Attending: EMERGENCY MEDICINE
Payer: COMMERCIAL

## 2025-02-19 VITALS
RESPIRATION RATE: 16 BRPM | BODY MASS INDEX: 34.55 KG/M2 | OXYGEN SATURATION: 96 % | TEMPERATURE: 98 F | DIASTOLIC BLOOD PRESSURE: 71 MMHG | WEIGHT: 220.13 LBS | HEART RATE: 81 BPM | HEIGHT: 67 IN | SYSTOLIC BLOOD PRESSURE: 119 MMHG

## 2025-02-19 DIAGNOSIS — I26.99 PULMONARY EMBOLISM, UNSPECIFIED CHRONICITY, UNSPECIFIED PULMONARY EMBOLISM TYPE, UNSPECIFIED WHETHER ACUTE COR PULMONALE PRESENT: Primary | ICD-10-CM

## 2025-02-19 DIAGNOSIS — R07.9 CHEST PAIN: ICD-10-CM

## 2025-02-19 LAB
ALBUMIN SERPL-MCNC: 3.5 G/DL (ref 3.5–5)
ALBUMIN/GLOB SERPL: 1.3 RATIO (ref 1.1–2)
ALP SERPL-CCNC: 59 UNIT/L (ref 40–150)
ALT SERPL-CCNC: 12 UNIT/L (ref 0–55)
ANION GAP SERPL CALC-SCNC: 6 MEQ/L
AST SERPL-CCNC: 13 UNIT/L (ref 5–34)
BASOPHILS # BLD AUTO: 0.04 X10(3)/MCL
BASOPHILS NFR BLD AUTO: 0.5 %
BILIRUB SERPL-MCNC: 0.4 MG/DL
BUN SERPL-MCNC: 12.2 MG/DL (ref 9.8–20.1)
CALCIUM SERPL-MCNC: 8.8 MG/DL (ref 8.4–10.2)
CHLORIDE SERPL-SCNC: 107 MMOL/L (ref 98–107)
CO2 SERPL-SCNC: 25 MMOL/L (ref 22–29)
CREAT SERPL-MCNC: 0.75 MG/DL (ref 0.55–1.02)
CREAT/UREA NIT SERPL: 16
D DIMER PPP IA.FEU-MCNC: 0.78 UG/ML FEU (ref 0–0.5)
EOSINOPHIL # BLD AUTO: 0.14 X10(3)/MCL (ref 0–0.9)
EOSINOPHIL NFR BLD AUTO: 1.7 %
ERYTHROCYTE [DISTWIDTH] IN BLOOD BY AUTOMATED COUNT: 17.6 % (ref 11.5–17)
GFR SERPLBLD CREATININE-BSD FMLA CKD-EPI: >60 ML/MIN/1.73/M2
GLOBULIN SER-MCNC: 2.8 GM/DL (ref 2.4–3.5)
GLUCOSE SERPL-MCNC: 177 MG/DL (ref 74–100)
HCT VFR BLD AUTO: 39.1 % (ref 37–47)
HGB BLD-MCNC: 12.3 G/DL (ref 12–16)
IMM GRANULOCYTES # BLD AUTO: 0.03 X10(3)/MCL (ref 0–0.04)
IMM GRANULOCYTES NFR BLD AUTO: 0.4 %
LYMPHOCYTES # BLD AUTO: 2.13 X10(3)/MCL (ref 0.6–4.6)
LYMPHOCYTES NFR BLD AUTO: 25.4 %
MCH RBC QN AUTO: 26 PG (ref 27–31)
MCHC RBC AUTO-ENTMCNC: 31.5 G/DL (ref 33–36)
MCV RBC AUTO: 82.7 FL (ref 80–94)
MONOCYTES # BLD AUTO: 0.55 X10(3)/MCL (ref 0.1–1.3)
MONOCYTES NFR BLD AUTO: 6.5 %
NEUTROPHILS # BLD AUTO: 5.51 X10(3)/MCL (ref 2.1–9.2)
NEUTROPHILS NFR BLD AUTO: 65.5 %
NRBC BLD AUTO-RTO: 0 %
PLATELET # BLD AUTO: 178 X10(3)/MCL (ref 130–400)
PMV BLD AUTO: 10 FL (ref 7.4–10.4)
POTASSIUM SERPL-SCNC: 3.5 MMOL/L (ref 3.5–5.1)
PROT SERPL-MCNC: 6.3 GM/DL (ref 6.4–8.3)
RBC # BLD AUTO: 4.73 X10(6)/MCL (ref 4.2–5.4)
SODIUM SERPL-SCNC: 138 MMOL/L (ref 136–145)
TROPONIN I SERPL-MCNC: <0.01 NG/ML (ref 0–0.04)
TROPONIN I SERPL-MCNC: <0.01 NG/ML (ref 0–0.04)
WBC # BLD AUTO: 8.4 X10(3)/MCL (ref 4.5–11.5)

## 2025-02-19 PROCEDURE — 80053 COMPREHEN METABOLIC PANEL: CPT | Performed by: EMERGENCY MEDICINE

## 2025-02-19 PROCEDURE — 85379 FIBRIN DEGRADATION QUANT: CPT | Performed by: EMERGENCY MEDICINE

## 2025-02-19 PROCEDURE — 85025 COMPLETE CBC W/AUTO DIFF WBC: CPT | Performed by: EMERGENCY MEDICINE

## 2025-02-19 PROCEDURE — 25000003 PHARM REV CODE 250: Performed by: EMERGENCY MEDICINE

## 2025-02-19 PROCEDURE — 99285 EMERGENCY DEPT VISIT HI MDM: CPT | Mod: 25

## 2025-02-19 PROCEDURE — 84484 ASSAY OF TROPONIN QUANT: CPT | Performed by: EMERGENCY MEDICINE

## 2025-02-19 PROCEDURE — 25500020 PHARM REV CODE 255

## 2025-02-19 RX ORDER — RIVAROXABAN 15 MG-20MG
KIT ORAL
Qty: 51 TABLET | Refills: 0 | Status: SHIPPED | OUTPATIENT
Start: 2025-02-19

## 2025-02-19 RX ORDER — ALUMINUM HYDROXIDE, MAGNESIUM HYDROXIDE, AND SIMETHICONE 1200; 120; 1200 MG/30ML; MG/30ML; MG/30ML
30 SUSPENSION ORAL
Status: COMPLETED | OUTPATIENT
Start: 2025-02-19 | End: 2025-02-19

## 2025-02-19 RX ORDER — RIVAROXABAN 15 MG-20MG
KIT ORAL
Qty: 51 TABLET | Refills: 0 | Status: SHIPPED | OUTPATIENT
Start: 2025-02-19 | End: 2025-02-19

## 2025-02-19 RX ORDER — LIDOCAINE HYDROCHLORIDE 20 MG/ML
5 SOLUTION OROPHARYNGEAL
Status: COMPLETED | OUTPATIENT
Start: 2025-02-19 | End: 2025-02-19

## 2025-02-19 RX ADMIN — LIDOCAINE HYDROCHLORIDE 5 ML: 20 SOLUTION ORAL at 08:02

## 2025-02-19 RX ADMIN — RIVAROXABAN 15 MG: 15 TABLET, FILM COATED ORAL at 11:02

## 2025-02-19 RX ADMIN — ALUMINUM HYDROXIDE, MAGNESIUM HYDROXIDE, AND SIMETHICONE 30 ML: 200; 200; 20 SUSPENSION ORAL at 08:02

## 2025-02-19 RX ADMIN — IOHEXOL 97 ML: 350 INJECTION, SOLUTION INTRAVENOUS at 09:02

## 2025-02-20 ENCOUNTER — TELEPHONE (OUTPATIENT)
Dept: PHARMACY | Facility: CLINIC | Age: 50
End: 2025-02-20
Payer: COMMERCIAL

## 2025-02-20 ENCOUNTER — TELEPHONE (OUTPATIENT)
Dept: PRIMARY CARE CLINIC | Facility: CLINIC | Age: 50
End: 2025-02-20
Payer: COMMERCIAL

## 2025-02-20 LAB
OHS QRS DURATION: 86 MS
OHS QTC CALCULATION: 445 MS

## 2025-02-20 NOTE — ED PROVIDER NOTES
Encounter Date: 2025       History     Chief Complaint   Patient presents with    Chest Pain     Reports sudden right side chest pain that started about 45 min PTA and after donating plasma. States took 2 baby aspirins in hopes it would help but it kept getting worse     50-year-old female presents with epigastric abdominal pain, nausea, shortness of breath.  Patient endorses these symptoms are similar to previous episodes attributed to anxiety or indigestion.  She feels that today's symptoms are more accentuated in comparison to previous episodes.  She acknowledges she is feeling somewhat anxious given a family history of coronary disease (at a late age).  Patient is without fever, chills, vomiting, dyspnea, cough.        Review of patient's allergies indicates:   Allergen Reactions    Codeine Rash     Past Medical History:   Diagnosis Date    Anemia     Fracture of triquetrum 2020    Obesity 2022    Osteoarthritis 2022    Personal history of colonic polyps     Plantar fasciitis of right foot 2022    Prediabetes 2022    Primary hypertension 2022    Urinary tract infection     Vitamin D deficiency 2022     Past Surgical History:   Procedure Laterality Date     SECTION      , ,     COLONOSCOPY  2022     Family History   Problem Relation Name Age of Onset    Hypertension Father      Cancer Mother      Alzheimer's disease Mother      Diabetes Mother      Hypertension Mother      Pancreatic cancer Mother      Cancer Maternal Grandmother       Social History[1]  Review of Systems    Physical Exam     Initial Vitals [25]   BP Pulse Resp Temp SpO2   (!) 156/88 95 20 97.6 °F (36.4 °C) 100 %      MAP       --         Physical Exam    ED Course   Procedures  Labs Reviewed   COMPREHENSIVE METABOLIC PANEL - Abnormal       Result Value    Sodium 138      Potassium 3.5      Chloride 107      CO2 25      Glucose 177 (*)     Blood Urea Nitrogen 12.2       Creatinine 0.75      Calcium 8.8      Protein Total 6.3 (*)     Albumin 3.5      Globulin 2.8      Albumin/Globulin Ratio 1.3      Bilirubin Total 0.4      ALP 59      ALT 12      AST 13      eGFR >60      Anion Gap 6.0      BUN/Creatinine Ratio 16     D DIMER, QUANTITATIVE - Abnormal    D-Dimer 0.78 (*)    CBC WITH DIFFERENTIAL - Abnormal    WBC 8.40      RBC 4.73      Hgb 12.3      Hct 39.1      MCV 82.7      MCH 26.0 (*)     MCHC 31.5 (*)     RDW 17.6 (*)     Platelet 178      MPV 10.0      Neut % 65.5      Lymph % 25.4      Mono % 6.5      Eos % 1.7      Basophil % 0.5      Imm Grans % 0.4      Neut # 5.51      Lymph # 2.13      Mono # 0.55      Eos # 0.14      Baso # 0.04      Imm Gran # 0.03      NRBC% 0.0     TROPONIN I - Normal    Troponin-I <0.010     TROPONIN I - Normal    Troponin-I <0.010     CBC W/ AUTO DIFFERENTIAL    Narrative:     The following orders were created for panel order CBC auto differential.  Procedure                               Abnormality         Status                     ---------                               -----------         ------                     CBC with Differential[[id number]]       Abnormal            Final result                 Please view results for these tests on the individual orders.     EKG Readings: (Independently Interpreted)   Normal sinus rhythm at 71, nonacute and nonischemic appearing;       Imaging Results              CTA Chest Non-Coronary (PE Studies) (Preliminary result)  Result time 02/19/25 23:02:25      Preliminary result by Alfred Ramirez Jr., MD (02/19/25 23:02:25)                   Narrative:    START OF REPORT:  Technique: CT Scan of the chest was performed with intravenous contrast with direct axial images as well as sagittal and coronal reconstruction images pulmonary embolus protocol.    Dosage Information: Automated Exposure Control was utilized.    Comparison: None.    Clinical History: Pulmonary embolism (PE) suspected,  positive D-dimer.    Findings:  Soft Tissues: Unremarkable.  Neck: The visualized soft tissues of the neck appear unremarkable.  Mediastinum: The mediastinal structures are within normal limits.  Heart: The heart appears unremarkable.  Aorta: No aortic dissection or aneurysm is seen.  Pulmonary Arteries: Filling defects are seen in the right lower lobe pulmonary artery extending to the segmental and subsegmental divisions (series 11 images 136 to 160). No evidence of right heart strain is seen. This is consistent with pulmonary thromboembolism.  Lungs: There is moderate non specific dependent change at the lung bases. No focal infiltrate or consolidation is seen.  Pleura: No effusions or pneumothorax are identified.  Bony Structures:  Spine: The visualized dorsal spine appears unremarkable.  Ribs: The bilateral ribs appear unremarkable.  Abdomen: The visualized upper abdominal organs appear unremarkable.    Notifications: The results were discussed with the emergency room physician Dr Ivory prior to dictation at 2025-02-19 23:01:30 CST.      Impression:  1. Filling defects are seen in the right lower lobe pulmonary artery extending to the segmental and subsegmental divisions (series 11 images 136 to 160). No evidence of right heart strain is seen. This is consistent with pulmonary thromboembolism. Correlate with clinical findings as regards additional evaluation and follow-up.  2. No focal infiltrate or consolidation is seen.  3. Details and other findings as discussed above.                                         X-Ray Chest AP Portable (Final result)  Result time 02/19/25 21:17:08      Final result by Jayden Valdovinos MD (02/19/25 21:17:08)                   Impression:      No acute cardiopulmonary process identified.      Electronically signed by: Jayden Valdovinos  Date:    02/19/2025  Time:    21:17               Narrative:    EXAMINATION:  XR CHEST AP PORTABLE    CLINICAL HISTORY:  Chest  pain.    TECHNIQUE:  One view    COMPARISON:  December 11, 2022.    FINDINGS:  Cardiopericardial silhouette is within normal limits.  No acute dense focal or segmental consolidation, congestive process, pleural effusions or pneumothorax.                                    X-Rays:   Independently Interpreted Readings:   Other Readings:  Chest x-ray without acute abnormal findings ; CT angiography demonstrates subsegmental pulmonary emboli; no right heart strain    Medications   rivaroxaban tablet 15 mg (has no administration in time range)   aluminum-magnesium hydroxide-simethicone 200-200-20 mg/5 mL suspension 30 mL (30 mLs Oral Given 2/19/25 2033)   LIDOcaine viscous HCl 2% oral solution 5 mL (5 mLs Oral Given 2/19/25 2033)   iohexoL (OMNIPAQUE 350) 350 mg iodine/mL injection (97 mLs Intravenous Given 2/19/25 2145)     Medical Decision Making  Differential diagnosis GERD, gastritis, pulmonary embolism, ACS/NSTEMI, bronchitis, pneumonia, others ...    Amount and/or Complexity of Data Reviewed  Labs: ordered. Decision-making details documented in ED Course.     Details: As above;  Radiology: ordered and independent interpretation performed. Decision-making details documented in ED Course.     Details: Chest x-ray without acute abnormal findings ;  ECG/medicine tests: ordered and independent interpretation performed. Decision-making details documented in ED Course.     Details: Normal sinus rhythm at 71, nonacute and nonischemic appearing;    Risk  OTC drugs.  Prescription drug management.  Risk Details: Patient is here this evening with an atypical chest pain syndrome for which risk found sufficient to warrant expanded evaluation with objective data.  Initial dimer modestly elevated for which CT angiography obtain a demonstrates subsegmental pulmonary emboli.  No features suggestive of augmented risk.  Plan initiate treatment with Xarelto, expedited follow up in Medicine Clinic for continued monitoring, therapeutic  decisions.  Patient is discharged with prescriptions, anticipatory guidance, return precautions, links to follow up.  Home in stable condition without event.               ED Course as of 02/19/25 2327 Wed Feb 19, 2025 2112 Modestly elevated dimer; [CT]   2112 Reassuring hemogram; [CT]   2141 Negative troponin; [CT]   2245 Troponin negative x2; [CT]      ED Course User Index  [CT] Eric Haider MD                           Clinical Impression:  Final diagnoses:  [R07.9] Chest pain  [I26.99] Pulmonary embolism, unspecified chronicity, unspecified pulmonary embolism type, unspecified whether acute cor pulmonale present (Primary)          ED Disposition Condition    Discharge Stable          ED Prescriptions       Medication Sig Dispense Start Date End Date Auth. Provider    rivaroxaban (XARELTO DVT-PE TREAT 30D START) 15 mg (42)- 20 mg (9) tablet dose pack Take 1 tablet (15 mg) by mouth twice daily with food for 21 days followed by 1 tablet (20 mg) by mouth once daily with food 51 tablet 2/19/2025 -- Eric Haider MD          Follow-up Information       Follow up With Specialties Details Why Contact Info    [facility] Emergency Medicine  As needed, If symptoms worsen [address] [facility] [address] [phone]    Nidia Haddad MD Family Medicine Call   [address] [facility]  [address] [place] [id number]  [phone]                   [1]   Social History  Tobacco Use    Smoking status: Never    Smokeless tobacco: Never   Substance Use Topics    Alcohol use: Never    Drug use: Never        Eric Haider MD  02/19/25 2327

## 2025-02-20 NOTE — TELEPHONE ENCOUNTER
Is patient medically clear to start Terbinafine with [facility]?    ER note is in chart.

## 2025-02-20 NOTE — TELEPHONE ENCOUNTER
[facility] called stating pt was seen today and is needing clearance before starting Terbinafine for her skin rash. They are asking for a written clearance to be faxed to them [phone]    They also wanted to let us know she was in the ER last night for a blood clot. She is on blood thinners currently.

## 2025-02-20 NOTE — DISCHARGE INSTRUCTIONS
Please take the prescribed blood thinner as directed.  Please expect a telephone call to set a follow up appointment in family Medicine.  Please return here if condition worsening rather than improving as expected.

## 2025-02-20 NOTE — TELEPHONE ENCOUNTER
[facility] Chart Review & Patient Outreach Details For Medication Adherence Project    Reason for Outreach Encounter: 3rd Party payor non-compliance report ([employer], [employer], C, etc)  2.  Patient Outreach Method: Reviewed patient chart   3.   Medication in question:    Diabetes Medications              metFORMIN (GLUCOPHAGE-XR) 500 MG ER 24hr tablet Take 1 tablet (500 mg total) by mouth Daily.                 metformin  last filled  12/4 for 90 day supply      4.  Reviewed and or Updates Made To: Patient Chart  5. Outreach Outcomes and/or actions taken: Patient filled medication and is on track to be adherent  Additional Notes:

## 2025-02-22 NOTE — TELEPHONE ENCOUNTER
----- Message from Ernestina Woo sent at 5/28/2024  1:33 PM CDT -----  Regarding: return call  Type:  Patient Returning Call    Who Called:pt  Who Left Message for Patient:waylon  Does the patient know what this is regarding?:  Would the patient rather a call back or a response via [facility]? C/b  Best Call Back Number:[phone]  Additional Information:  
Informed patient that HIV and trichomonas was negative. Informed patient that syphilis testing is still in process.  
Patient/Caregiver provided printed discharge information.

## 2025-02-24 ENCOUNTER — OFFICE VISIT (OUTPATIENT)
Dept: PRIMARY CARE CLINIC | Facility: CLINIC | Age: 50
End: 2025-02-24
Payer: COMMERCIAL

## 2025-02-24 VITALS
SYSTOLIC BLOOD PRESSURE: 128 MMHG | WEIGHT: 218 LBS | TEMPERATURE: 97 F | HEIGHT: 67 IN | DIASTOLIC BLOOD PRESSURE: 80 MMHG | RESPIRATION RATE: 18 BRPM | HEART RATE: 69 BPM | OXYGEN SATURATION: 98 % | BODY MASS INDEX: 34.21 KG/M2

## 2025-02-24 DIAGNOSIS — Z09 HOSPITAL DISCHARGE FOLLOW-UP: Primary | ICD-10-CM

## 2025-02-24 DIAGNOSIS — I26.99 PE (PULMONARY THROMBOEMBOLISM): ICD-10-CM

## 2025-02-24 DIAGNOSIS — B35.4 RINGWORM OF BODY: ICD-10-CM

## 2025-02-24 PROCEDURE — 3074F SYST BP LT 130 MM HG: CPT | Mod: CPTII,,, | Performed by: STUDENT IN AN ORGANIZED HEALTH CARE EDUCATION/TRAINING PROGRAM

## 2025-02-24 PROCEDURE — 1160F RVW MEDS BY RX/DR IN RCRD: CPT | Mod: CPTII,,, | Performed by: STUDENT IN AN ORGANIZED HEALTH CARE EDUCATION/TRAINING PROGRAM

## 2025-02-24 PROCEDURE — 3079F DIAST BP 80-89 MM HG: CPT | Mod: CPTII,,, | Performed by: STUDENT IN AN ORGANIZED HEALTH CARE EDUCATION/TRAINING PROGRAM

## 2025-02-24 PROCEDURE — 3066F NEPHROPATHY DOC TX: CPT | Mod: CPTII,,, | Performed by: STUDENT IN AN ORGANIZED HEALTH CARE EDUCATION/TRAINING PROGRAM

## 2025-02-24 PROCEDURE — 99214 OFFICE O/P EST MOD 30 MIN: CPT | Mod: ,,, | Performed by: STUDENT IN AN ORGANIZED HEALTH CARE EDUCATION/TRAINING PROGRAM

## 2025-02-24 PROCEDURE — 3008F BODY MASS INDEX DOCD: CPT | Mod: CPTII,,, | Performed by: STUDENT IN AN ORGANIZED HEALTH CARE EDUCATION/TRAINING PROGRAM

## 2025-02-24 PROCEDURE — 3044F HG A1C LEVEL LT 7.0%: CPT | Mod: CPTII,,, | Performed by: STUDENT IN AN ORGANIZED HEALTH CARE EDUCATION/TRAINING PROGRAM

## 2025-02-24 PROCEDURE — 1159F MED LIST DOCD IN RCRD: CPT | Mod: CPTII,,, | Performed by: STUDENT IN AN ORGANIZED HEALTH CARE EDUCATION/TRAINING PROGRAM

## 2025-02-24 PROCEDURE — 3061F NEG MICROALBUMINURIA REV: CPT | Mod: CPTII,,, | Performed by: STUDENT IN AN ORGANIZED HEALTH CARE EDUCATION/TRAINING PROGRAM

## 2025-02-24 RX ORDER — KETOCONAZOLE 20 MG/G
CREAM TOPICAL 2 TIMES DAILY
COMMUNITY
Start: 2025-02-20

## 2025-02-24 NOTE — PROGRESS NOTES
Chief Complaint  Chief Complaint   Patient presents with    Follow-up     Dx'd with PE, started on Xarelto     Fungal skin rash     Dr Carlton would like fungal infection        HPI  Karen Tapia is a 50 y.o. female with medical diagnoses as listed in the medical history and problem list that presents to clinic for follow up on PE. She was just finishing plasma donation and as soon as she got to the car, she felt severe chest pain and shortness of breath. CT angiogram in the hospital confirmed PE and started patient on Xarelto starting pack. Since then patient has felt better in breathing. Denies pain. Patient states that she has strong family history of DVT and PE in her father who has to be on anticoagulant whole life.   Also has a sister who passed away due to MI recently.     She has returned to work and is complaint with Xarelto.   She also follows up with derm and was diagnosed with ring-worm infection. Awaiting to start medications.   Health Maintenance         Date Due Completion Date    Mammogram 05/09/2025 5/9/2024    TETANUS VACCINE 12/03/2025 (Originally 2/10/2015) 2/10/2005    Shingles Vaccine (1 of 2) 01/24/2026 (Originally 2/1/2023) ---    Pneumococcal Vaccines (Age 50+) (1 of 1 - PCV) 01/24/2026 (Originally 2/1/2023) ---    Hemoglobin A1c (Prediabetes) 01/24/2026 1/24/2025    Colorectal Cancer Screening 09/01/2027 9/1/2022    Cervical Cancer Screening 12/28/2027 12/28/2022    Lipid Panel 01/24/2030 1/24/2025    RSV Vaccine (Age 60+ and Pregnant patients) (1 - 1-dose 75+ series) 02/01/2048 ---            ALLERGIES AND MEDICATIONS: updated and reviewed.  Review of patient's allergies indicates:   Allergen Reactions    Codeine Rash     Current Medications[1]    Histories are reviewed and updated as appropriate     Review of Systems  Comprehensive review of system performed- negative except noted in HPI       Objective:   Vitals:    02/24/25 1549   BP: 128/80   BP Location: Left arm   Patient  "Position: Sitting   Pulse: 69   Resp: 18   Temp: 97.2 °F (36.2 °C)   TempSrc: Oral   SpO2: 98%   Weight: 98.9 kg (218 lb)   Height: 5' 7" (1.702 m)    Body mass index is 34.14 kg/m².  Physical Exam  Vitals and nursing note reviewed.   Constitutional:       General: She is not in acute distress.     Appearance: Normal appearance. She is obese.   HENT:      Head: Normocephalic and atraumatic.      Mouth/Throat:      Mouth: Mucous membranes are moist.   Eyes:      Extraocular Movements: Extraocular movements intact.      Conjunctiva/sclera: Conjunctivae normal.   Cardiovascular:      Rate and Rhythm: Normal rate.   Pulmonary:      Effort: Pulmonary effort is normal.   Musculoskeletal:         General: Normal range of motion.      Cervical back: Normal range of motion.   Skin:     General: Skin is warm and dry.   Neurological:      General: No focal deficit present.      Mental Status: She is alert and oriented to person, place, and time. Mental status is at baseline.   Psychiatric:         Mood and Affect: Mood normal.         Behavior: Behavior normal.           Assessment & Plan  1. Hospital discharge follow-up  Recovering as expected     2. PE (pulmonary thromboembolism)  Cont Xarelto for at least 6 months.   Consider hematologist referral in the future given strong history of blood clot in the family     3. Ringworm of body  Patient is clear to start terbinafine per derm.   Liver function test in 3-6 months        RTC in 4-5 months          [1]   Current Outpatient Medications   Medication Sig Dispense Refill    amLODIPine (NORVASC) 5 MG tablet Take 1 tablet (5 mg total) by mouth once daily. 90 tablet 3    cyanocobalamin (VITAMIN B-12) 1000 MCG tablet Take 100 mcg by mouth once daily.      ergocalciferol (ERGOCALCIFEROL) 50,000 unit Cap Take 50,000 Units by mouth every 7 days.      estradioL (ESTRACE) 0.01 % (0.1 mg/gram) vaginal cream Use a pea sized amount to the vagina once a night for 14 nights when initiating " the medication, then 2-3 times per week.  DO NOT use the applicator. 42.5 g 10    ketoconazole (NIZORAL) 2 % cream Apply topically 2 (two) times daily.      metFORMIN (GLUCOPHAGE-XR) 500 MG ER 24hr tablet Take 1 tablet (500 mg total) by mouth Daily. 90 tablet 3    methenamine/sodium salicylate (AZO URINARY TRACT DEFENSE ORAL) Take by mouth daily as needed (UTI's).      multivitamin with minerals tablet Take 1 tablet by mouth once daily. Iron supplement PRN      NON FORMULARY MEDICATION Take 1 capsule by mouth Daily. Femdophilus-urinary probiotic      NON FORMULARY MEDICATION Take 2 tablets by mouth once daily. UriCalm-for urinary health      rivaroxaban (XARELTO DVT-PE TREAT 30D START) 15 mg (42)- 20 mg (9) tablet dose pack Take 1 tablet (15 mg) by mouth twice daily with food for 21 days followed by 1 tablet (20 mg) by mouth once daily with food 51 tablet 0     No current facility-administered medications for this visit.

## 2025-02-26 ENCOUNTER — TELEPHONE (OUTPATIENT)
Dept: PRIMARY CARE CLINIC | Facility: CLINIC | Age: 50
End: 2025-02-26
Payer: COMMERCIAL

## 2025-02-26 RX ORDER — BACLOFEN 10 MG/1
10 TABLET ORAL 3 TIMES DAILY
Qty: 90 TABLET | Refills: 0 | Status: SHIPPED | OUTPATIENT
Start: 2025-02-26 | End: 2026-02-26

## 2025-02-26 NOTE — TELEPHONE ENCOUNTER
----- Message from Yesica sent at 2/26/2025  1:30 PM CST -----  Regarding: health update  .Type:  Needs Medical AdviceWho Called: ptSymptoms (please be specific):  How long has patient had these symptoms:  Pharmacy name and phone #:  Would the patient rather a call back or a response via [facility]? Be Call Back Number:  [phone] Information: lower back pain throbbing

## 2025-02-26 NOTE — TELEPHONE ENCOUNTER
Patient reports her lower back pain is still present. When she came for her visit, it was only the left side. It now is across the entire lower back. She describes it as a throbbing,spasm type pain.     She didn't want to take anything over the counter due to being on Xarelto. She is asking if a mild muscle relaxer may help?

## 2025-03-07 ENCOUNTER — TELEPHONE (OUTPATIENT)
Dept: PHARMACY | Facility: CLINIC | Age: 50
End: 2025-03-07
Payer: COMMERCIAL

## 2025-03-07 NOTE — TELEPHONE ENCOUNTER
[facility] Chart Review & Patient Outreach Details For Medication Adherence Project    Reason for Outreach Encounter: 3rd Party payor non-compliance report ([employer], BCBS, C, etc)  2.  Patient Outreach Method: Reviewed patient chart  and [employer] message  3.   Medication in question:    Diabetes Medications              metFORMIN (GLUCOPHAGE-XR) 500 MG ER 24hr tablet Take 1 tablet (500 mg total) by mouth Daily.                 metformin  last filled  12/4 for 90 day supply      4.  Reviewed and or Updates Made To: Patient Chart  5. Outreach Outcomes and/or actions taken: Sent inquiry to patient: Waiting for response  Additional Notes:

## 2025-03-13 ENCOUNTER — OFFICE VISIT (OUTPATIENT)
Dept: URGENT CARE | Facility: CLINIC | Age: 50
End: 2025-03-13
Payer: COMMERCIAL

## 2025-03-13 VITALS
RESPIRATION RATE: 18 BRPM | DIASTOLIC BLOOD PRESSURE: 81 MMHG | WEIGHT: 222 LBS | HEIGHT: 67 IN | BODY MASS INDEX: 34.84 KG/M2 | TEMPERATURE: 99 F | HEART RATE: 76 BPM | SYSTOLIC BLOOD PRESSURE: 128 MMHG | OXYGEN SATURATION: 99 %

## 2025-03-13 DIAGNOSIS — N30.01 ACUTE CYSTITIS WITH HEMATURIA: Primary | ICD-10-CM

## 2025-03-13 DIAGNOSIS — R35.0 URINARY FREQUENCY: ICD-10-CM

## 2025-03-13 LAB
BILIRUB UR QL STRIP: POSITIVE
GLUCOSE UR QL STRIP: POSITIVE
KETONES UR QL STRIP: POSITIVE
LEUKOCYTE ESTERASE UR QL STRIP: POSITIVE
PH, POC UA: 5
POC BLOOD, URINE: POSITIVE
POC NITRATES, URINE: POSITIVE
PROT UR QL STRIP: POSITIVE
SP GR UR STRIP: <=1.005 (ref 1–1.03)
UROBILINOGEN UR STRIP-ACNC: >=8 (ref 0.1–1.1)

## 2025-03-13 PROCEDURE — 99214 OFFICE O/P EST MOD 30 MIN: CPT | Mod: S$PBB,,,

## 2025-03-13 PROCEDURE — 87086 URINE CULTURE/COLONY COUNT: CPT

## 2025-03-13 PROCEDURE — 81003 URINALYSIS AUTO W/O SCOPE: CPT | Mod: PBBFAC

## 2025-03-13 PROCEDURE — 99215 OFFICE O/P EST HI 40 MIN: CPT | Mod: PBBFAC

## 2025-03-13 RX ORDER — NITROFURANTOIN 25; 75 MG/1; MG/1
100 CAPSULE ORAL 2 TIMES DAILY
Qty: 14 CAPSULE | Refills: 0 | Status: SHIPPED | OUTPATIENT
Start: 2025-03-13 | End: 2025-03-15

## 2025-03-13 NOTE — PROGRESS NOTES
"Subjective:       Patient ID: Karen Tapia is a 50 y.o. female.    Vitals:  height is 5' 7" (1.702 m) and weight is 100.7 kg (222 lb). Her oral temperature is 98.8 °F (37.1 °C). Her blood pressure is 128/81 and her pulse is 76. Her respiration is 18 and oxygen saturation is 99%.     Chief Complaint: Urinary Tract Infection (Cloudy, frequency, burning x 2 wks/)    50-year-old female presents to the clinic with complaints of cloudy urine, urinary frequency and urinary burning that began about 2 weeks ago.  Patient states she has started taking over-the-counter azo for her urinary symptoms but states the symptoms have persisted despite taking the azo.  Patient denies fever, nausea, vomiting, abdominal pain, headache, chills, fatigue, body aches, lower abdominal pain, back pain.    All other systems are negative    Chart reviewed    Objective:   Physical Exam   Constitutional: She appears well-developed.  Non-toxic appearance. She does not appear ill. No distress.   Neck: Neck supple.   Cardiovascular: Regular rhythm.   Pulmonary/Chest: Effort normal and breath sounds normal.   Abdominal: Normal appearance. She exhibits no distension. Soft. flat abdomen There is no abdominal tenderness. There is no rebound, no guarding, no left CVA tenderness and no right CVA tenderness.   Musculoskeletal: Normal range of motion.         General: Normal range of motion.   Neurological: no focal deficit. She is alert.   Skin: Skin is warm, dry and not diaphoretic.   Psychiatric: Her behavior is normal. Mood normal.   Nursing note and vitals reviewed.      Assessment:     1. Acute cystitis with hematuria    2. Urinary frequency        Results for orders placed or performed in visit on 03/13/25   POCT Urinalysis, Dipstick, Automated, W/O Scope    Collection Time: 03/13/25  3:25 PM   Result Value Ref Range    POC Blood, Urine Positive (A) Negative    POC Bilirubin, Urine Positive (A) Negative    POC Urobilinogen, Urine >=8.0 0.1 - 1.1 "    POC Ketones, Urine Positive (A) Negative    POC Protein, Urine Positive (A) Negative    POC Nitrates, Urine Positive (A) Negative    POC Glucose, Urine Positive (A) Negative    pH, UA 5.0     POC Specific Gravity, Urine <=1.005 1.003 - 1.029    POC Leukocytes, Urine Positive (A) Negative         Plan:     Complete full course of antibiotics.      Drink plenty of water daily. Avoid soda.    Follow up with your primary care doctor as needed.    Present to the nearest Emergency Department with any significant change or worsening of symptoms including but not limited to blood in urine, nausea/vomiting, abdominal pain, fever, body aches, chills, or flank pain.      Acute cystitis with hematuria  -     Urine Culture High Risk  -     nitrofurantoin, macrocrystal-monohydrate, (MACROBID) 100 MG capsule; Take 1 capsule (100 mg total) by mouth 2 (two) times daily. for 7 days  Dispense: 14 capsule; Refill: 0    Urinary frequency  -     POCT Urinalysis, Dipstick, Automated, W/O Scope        Please note: This chart was completed via voice to text dictation. It may contain typographical/word recognition errors. If there are any questions, please contact the provider for final clarification.

## 2025-03-14 ENCOUNTER — RESULTS FOLLOW-UP (OUTPATIENT)
Dept: URGENT CARE | Facility: CLINIC | Age: 50
End: 2025-03-14

## 2025-03-14 DIAGNOSIS — N39.0 UTI (URINARY TRACT INFECTION), UNCOMPLICATED: Primary | ICD-10-CM

## 2025-03-15 LAB — BACTERIA UR CULT: ABNORMAL

## 2025-03-15 RX ORDER — SULFAMETHOXAZOLE AND TRIMETHOPRIM 800; 160 MG/1; MG/1
1 TABLET ORAL 2 TIMES DAILY
Qty: 6 TABLET | Refills: 0 | Status: SHIPPED | OUTPATIENT
Start: 2025-03-15 | End: 2025-03-18

## 2025-03-17 ENCOUNTER — TELEPHONE (OUTPATIENT)
Dept: PRIMARY CARE CLINIC | Facility: CLINIC | Age: 50
End: 2025-03-17
Payer: COMMERCIAL

## 2025-03-17 DIAGNOSIS — I26.99 PE (PULMONARY THROMBOEMBOLISM): ICD-10-CM

## 2025-03-17 NOTE — TELEPHONE ENCOUNTER
----- Message from Cherie sent at 3/17/2025  9:28 AM CDT -----  Regarding: referral request  Who Called: Karen Cornejo the patient already have the specialty appointment scheduled?: noIf yes, what is the date of that appointment?:Referral to What Specialty:HematologyReason for Referral: Pulmonary thromboembolismDoes the patient want the referral with a specific physician?:noIf yes, which provider?: Is the specialist an [facility]?:Preferred Method of Contact: Phone CallPatient's Preferred Phone Number on File: [phone] Best Call Back Number, if different:Additional Information: stated that at f/u visit she was told that she is needing to stay on rivaroxaban (XARELTO) 20 mg Tab until Hematology referral was approved. Stated that she is needing rx resent to [facility] so she can have it picked it up this afternoon or tomorrow. [facility] [address] [place] [address]

## 2025-03-25 DIAGNOSIS — M19.90 OSTEOARTHRITIS, UNSPECIFIED OSTEOARTHRITIS TYPE, UNSPECIFIED SITE: Primary | Chronic | ICD-10-CM

## 2025-03-25 RX ORDER — BACLOFEN 10 MG/1
10 TABLET ORAL 3 TIMES DAILY
Qty: 90 TABLET | Refills: 0 | Status: SHIPPED | OUTPATIENT
Start: 2025-03-25

## 2025-04-07 ENCOUNTER — PATIENT OUTREACH (OUTPATIENT)
Facility: CLINIC | Age: 50
End: 2025-04-07
Payer: COMMERCIAL

## 2025-04-07 NOTE — PROGRESS NOTES
Former Raymundo pt.   Pt has est care with another provider in system, Nidia Jacobs MD. [facility] sequence edited. [facility] responsibility removed.    Est care :12/3/2024  Last Visit: 2/24/2025  Next  F/U: 7/24/2025   Health Maintenance Topics Overdue:  VBHM Score: 0     Patient is not due for any topics at this time.

## 2025-04-22 ENCOUNTER — TELEPHONE (OUTPATIENT)
Dept: PHARMACY | Facility: CLINIC | Age: 50
End: 2025-04-22
Payer: COMMERCIAL

## 2025-04-22 DIAGNOSIS — M19.90 OSTEOARTHRITIS, UNSPECIFIED OSTEOARTHRITIS TYPE, UNSPECIFIED SITE: Chronic | ICD-10-CM

## 2025-04-22 RX ORDER — BACLOFEN 10 MG/1
10 TABLET ORAL 3 TIMES DAILY
Qty: 90 TABLET | Refills: 0 | Status: SHIPPED | OUTPATIENT
Start: 2025-04-22

## 2025-04-23 NOTE — TELEPHONE ENCOUNTER
[facility] Chart Review & Patient Outreach Details For Medication Adherence Project    Reason for Outreach Encounter: 3rd Party payor non-compliance report ([employer], BC[employer], C, etc)  2.  Patient Outreach Method: Reviewed patient chart   3.   Medication in question:    Diabetes Medications              metFORMIN (GLUCOPHAGE-XR) 500 MG ER 24hr tablet Take 1 tablet (500 mg total) by mouth Daily.                 LF 90 ds 3/4/25    4.  Reviewed and or Updates Made To: Patient Chart  5. Outreach Outcomes and/or actions taken: Patient filled medication and is on track to be adherent  Additional Notes:

## 2025-04-24 ENCOUNTER — PATIENT MESSAGE (OUTPATIENT)
Dept: PRIMARY CARE CLINIC | Facility: CLINIC | Age: 50
End: 2025-04-24
Payer: COMMERCIAL

## 2025-05-13 ENCOUNTER — OFFICE VISIT (OUTPATIENT)
Dept: URGENT CARE | Facility: CLINIC | Age: 50
End: 2025-05-13
Payer: COMMERCIAL

## 2025-05-13 VITALS
RESPIRATION RATE: 18 BRPM | WEIGHT: 226 LBS | HEIGHT: 66 IN | SYSTOLIC BLOOD PRESSURE: 118 MMHG | OXYGEN SATURATION: 99 % | BODY MASS INDEX: 36.32 KG/M2 | HEART RATE: 59 BPM | DIASTOLIC BLOOD PRESSURE: 78 MMHG | TEMPERATURE: 98 F

## 2025-05-13 DIAGNOSIS — R35.0 URINARY FREQUENCY: ICD-10-CM

## 2025-05-13 DIAGNOSIS — N30.90 CYSTITIS: Primary | ICD-10-CM

## 2025-05-13 LAB
BILIRUB UR QL STRIP: NEGATIVE
GLUCOSE UR QL STRIP: NEGATIVE
KETONES UR QL STRIP: NEGATIVE
LEUKOCYTE ESTERASE UR QL STRIP: POSITIVE
PH, POC UA: 7
POC BLOOD, URINE: POSITIVE
POC NITRATES, URINE: POSITIVE
PROT UR QL STRIP: POSITIVE
SP GR UR STRIP: 1.02 (ref 1–1.03)
UROBILINOGEN UR STRIP-ACNC: ABNORMAL (ref 0.1–1.1)

## 2025-05-13 PROCEDURE — 87077 CULTURE AEROBIC IDENTIFY: CPT | Performed by: FAMILY MEDICINE

## 2025-05-13 PROCEDURE — 81003 URINALYSIS AUTO W/O SCOPE: CPT | Mod: PBBFAC | Performed by: FAMILY MEDICINE

## 2025-05-13 PROCEDURE — 99213 OFFICE O/P EST LOW 20 MIN: CPT | Mod: S$PBB,,, | Performed by: FAMILY MEDICINE

## 2025-05-13 PROCEDURE — 99215 OFFICE O/P EST HI 40 MIN: CPT | Mod: PBBFAC | Performed by: FAMILY MEDICINE

## 2025-05-13 RX ORDER — SULFAMETHOXAZOLE AND TRIMETHOPRIM 800; 160 MG/1; MG/1
1 TABLET ORAL 2 TIMES DAILY
Qty: 14 TABLET | Refills: 0 | Status: SHIPPED | OUTPATIENT
Start: 2025-05-13 | End: 2025-05-20

## 2025-05-13 NOTE — PROGRESS NOTES
"Subjective:       Patient ID: Karen Tapia is a 50 y.o. female.    Vitals:  height is 5' 6" (1.676 m) and weight is 102.5 kg (226 lb). Her temperature is 97.8 °F (36.6 °C). Her blood pressure is 118/78 and her pulse is 59 (abnormal). Her respiration is 18 and oxygen saturation is 99%.     Chief Complaint: Urinary Frequency (X 2days)    Patient with a history of recurrent UTIs, the last was 2 months ago, organisms resistant to nitrofurantoin.  Having 2 days of exacerbation of urinary frequency and dysuria, no suprapubic pain.  No back or flank pain, no fever.  Denies vaginal discharge or bleeding.    All other systems are negative    Chart reviewed    Objective:   Physical Exam   Constitutional:  Non-toxic appearance. She does not appear ill. No distress.   Neck: Neck supple.   Abdominal: Normal appearance. She exhibits no distension. flat abdomen There is no abdominal tenderness. There is no rebound, no guarding, no left CVA tenderness and no right CVA tenderness.   Neurological: no focal deficit. She is alert.   Skin: Skin is warm, dry and not diaphoretic.   Psychiatric: Her behavior is normal. Mood normal.   Nursing note and vitals reviewed.    Results for orders placed or performed in visit on 05/13/25   POCT Urinalysis, Dipstick, Automated, W/O Scope    Collection Time: 05/13/25 10:08 AM   Result Value Ref Range    POC Blood, Urine Positive (A) Negative    POC Bilirubin, Urine Negative Negative    POC Urobilinogen, Urine 0.2 E.U./dl 0.1 - 1.1    POC Ketones, Urine Negative Negative    POC Protein, Urine Positive (A) Negative    POC Nitrates, Urine Positive (A) Negative    POC Glucose, Urine Negative Negative    pH, UA 7.0     POC Specific Gravity, Urine 1.020 1.003 - 1.029    POC Leukocytes, Urine Positive (A) Negative         Assessment:     1. Cystitis    2. Urinary frequency            Plan:   Will give a course of antibiotics.  If further urine testing was done, we will notify the patient if results " require a change of antibiotics.  Discussed importance of fluids and monitoring condition closely.    Please follow instructions on patient education material.  Return to urgent care in 2 to 3 days if symptoms are not improving. Seek care immediately if new or worsening symptoms develop.      Cystitis  -     Urine Culture High Risk  -     sulfamethoxazole-trimethoprim 800-160mg (BACTRIM DS) 800-160 mg Tab; Take 1 tablet by mouth 2 (two) times daily. for 7 days  Dispense: 14 tablet; Refill: 0    Urinary frequency  -     POCT Urinalysis, Dipstick, Automated, W/O Scope        Portions of this report were dictated using voice recognition software. Content is subject to voice recognition errors

## 2025-05-15 ENCOUNTER — RESULTS FOLLOW-UP (OUTPATIENT)
Dept: URGENT CARE | Facility: CLINIC | Age: 50
End: 2025-05-15

## 2025-05-15 LAB — BACTERIA UR CULT: ABNORMAL

## 2025-06-25 ENCOUNTER — TELEPHONE (OUTPATIENT)
Dept: PHARMACY | Facility: CLINIC | Age: 50
End: 2025-06-25
Payer: COMMERCIAL

## 2025-06-25 NOTE — TELEPHONE ENCOUNTER
[facility]/Population Health Chart Review & Patient Outreach Details For Medication Adherence Project    Reason for Outreach Encounter: 3rd Party payor non-compliance report ([employer], [employer], C, etc)  2.  Patient Outreach Method: Reviewed patient chart   3.   Medication in question:    Diabetes Medications              metFORMIN (GLUCOPHAGE-XR) 500 MG ER 24hr tablet Take 1 tablet (500 mg total) by mouth Daily.                   last filled  6/6/25 for 90 day supply      4.  Reviewed and or Updates Made To: Patient Chart  5. Outreach Outcomes and/or actions taken: Patient filled medication and is on track to be adherent  Additional Notes:

## 2025-07-07 ENCOUNTER — TELEPHONE (OUTPATIENT)
Dept: PHARMACY | Facility: CLINIC | Age: 50
End: 2025-07-07
Payer: COMMERCIAL

## 2025-07-07 NOTE — TELEPHONE ENCOUNTER
[facility]Population [facility] Chart Review & Patient Outreach Details For Medication Adherence Project    Reason for Outreach Encounter: 3rd Party payor non-compliance report ([employer], [employer], C, etc)  2.  Patient Outreach Method: Reviewed Patient Chart  3.   Medication in question: Metformin ER 500mg   LAST FILLED: 6/6/25 for 90 day supply  Diabetes Medications              metFORMIN (GLUCOPHAGE-XR) 500 MG ER 24hr tablet Take 1 tablet (500 mg total) by mouth Daily.              4.  Reviewed and or Updates Made To: Patient Chart  5. Outreach Outcomes and/or actions taken: Patient filled medication and is on track to be adherent

## 2025-07-24 ENCOUNTER — OFFICE VISIT (OUTPATIENT)
Dept: PRIMARY CARE CLINIC | Facility: CLINIC | Age: 50
End: 2025-07-24
Payer: COMMERCIAL

## 2025-07-24 VITALS
OXYGEN SATURATION: 98 % | DIASTOLIC BLOOD PRESSURE: 80 MMHG | BODY MASS INDEX: 36.16 KG/M2 | WEIGHT: 225 LBS | SYSTOLIC BLOOD PRESSURE: 114 MMHG | HEIGHT: 66 IN | TEMPERATURE: 98 F | HEART RATE: 72 BPM | RESPIRATION RATE: 18 BRPM

## 2025-07-24 DIAGNOSIS — I10 ESSENTIAL (PRIMARY) HYPERTENSION: ICD-10-CM

## 2025-07-24 DIAGNOSIS — I26.99 PE (PULMONARY THROMBOEMBOLISM): ICD-10-CM

## 2025-07-24 PROCEDURE — 1160F RVW MEDS BY RX/DR IN RCRD: CPT | Mod: CPTII,,, | Performed by: STUDENT IN AN ORGANIZED HEALTH CARE EDUCATION/TRAINING PROGRAM

## 2025-07-24 PROCEDURE — 3074F SYST BP LT 130 MM HG: CPT | Mod: CPTII,,, | Performed by: STUDENT IN AN ORGANIZED HEALTH CARE EDUCATION/TRAINING PROGRAM

## 2025-07-24 PROCEDURE — 3066F NEPHROPATHY DOC TX: CPT | Mod: CPTII,,, | Performed by: STUDENT IN AN ORGANIZED HEALTH CARE EDUCATION/TRAINING PROGRAM

## 2025-07-24 PROCEDURE — 3061F NEG MICROALBUMINURIA REV: CPT | Mod: CPTII,,, | Performed by: STUDENT IN AN ORGANIZED HEALTH CARE EDUCATION/TRAINING PROGRAM

## 2025-07-24 PROCEDURE — 3079F DIAST BP 80-89 MM HG: CPT | Mod: CPTII,,, | Performed by: STUDENT IN AN ORGANIZED HEALTH CARE EDUCATION/TRAINING PROGRAM

## 2025-07-24 PROCEDURE — 3008F BODY MASS INDEX DOCD: CPT | Mod: CPTII,,, | Performed by: STUDENT IN AN ORGANIZED HEALTH CARE EDUCATION/TRAINING PROGRAM

## 2025-07-24 PROCEDURE — 3044F HG A1C LEVEL LT 7.0%: CPT | Mod: CPTII,,, | Performed by: STUDENT IN AN ORGANIZED HEALTH CARE EDUCATION/TRAINING PROGRAM

## 2025-07-24 PROCEDURE — 1159F MED LIST DOCD IN RCRD: CPT | Mod: CPTII,,, | Performed by: STUDENT IN AN ORGANIZED HEALTH CARE EDUCATION/TRAINING PROGRAM

## 2025-07-24 PROCEDURE — 99214 OFFICE O/P EST MOD 30 MIN: CPT | Mod: ,,, | Performed by: STUDENT IN AN ORGANIZED HEALTH CARE EDUCATION/TRAINING PROGRAM

## 2025-07-24 RX ORDER — AMLODIPINE BESYLATE 5 MG/1
5 TABLET ORAL DAILY
Qty: 90 TABLET | Refills: 3 | Status: SHIPPED | OUTPATIENT
Start: 2025-07-24 | End: 2026-07-24

## 2025-07-24 NOTE — PROGRESS NOTES
"Chief Complaint  Chief Complaint   Patient presents with    6 month chronic condition follow up    Pulmonary Embolism Follow Up     Has been on Xarelto since February 2024       HPI  Karen Tapia is a 50 y.o. female with medical diagnoses as listed in the medical history and problem list that presents for chronic conditions follow up. Patient is compliant with all medications. She is on month of Xarelto due to unprovoked 1st episode of PE. Denies any shortness of breath. Denies bleeding.   Patient lost her bottle of amlodipine 5mg daily when she moves into the house in [facility].   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   Denies other complaints     Health Maintenance         Date Due Completion Date    Mammogram 05/09/2025 5/9/2024    TETANUS VACCINE 12/03/2025 (Originally 2/10/2015) 2/10/2005    Shingles Vaccine (1 of 2) 01/24/2026 (Originally 2/1/2023) ---    Pneumococcal Vaccines (Age 50+) (1 of 1 - PCV) 01/24/2026 (Originally 2/1/2023) ---    Influenza Vaccine (1) 09/01/2025 12/3/2024 (Declined)    Override on 12/3/2024: Declined    Hemoglobin A1c (Prediabetes) 01/24/2026 1/24/2025    Colorectal Cancer Screening 09/01/2027 9/1/2022    Cervical Cancer Screening 12/28/2027 12/28/2022    Lipid Panel 01/24/2030 1/24/2025    RSV Vaccine (Age 60+ and Pregnant patients) (1 - 1-dose 75+ series) 02/01/2048 ---            ALLERGIES AND MEDICATIONS: updated and reviewed.  Review of patient's allergies indicates:   Allergen Reactions    Codeine Rash     Current Medications[1]    Histories are reviewed and updated as appropriate     Review of Systems  Comprehensive review of system performed- negative except noted in HPI       Objective:   Vitals:    07/24/25 1007   BP: 114/80   BP Location: Left arm   Patient Position: Sitting   Pulse: 72   Resp: 18   Temp: 98.1 °F (36.7 °C)   TempSrc: Oral   SpO2: 98%   Weight: 102.1 kg (225 lb)   Height: 5' 6" (1.676 m)    Body mass index " is 36.32 kg/m².  Physical Exam  Vitals and nursing note reviewed.   Constitutional:       General: She is not in acute distress.     Appearance: Normal appearance.   HENT:      Head: Normocephalic and atraumatic.      Mouth/Throat:      Mouth: Mucous membranes are moist.      Pharynx: Oropharynx is clear.   Eyes:      Extraocular Movements: Extraocular movements intact.      Conjunctiva/sclera: Conjunctivae normal.   Cardiovascular:      Rate and Rhythm: Normal rate and regular rhythm.   Pulmonary:      Effort: Pulmonary effort is normal.      Breath sounds: Normal breath sounds.   Abdominal:      General: Abdomen is flat.      Palpations: Abdomen is soft.   Musculoskeletal:         General: No swelling, tenderness, deformity or signs of injury. Normal range of motion.   Skin:     General: Skin is warm and dry.      Findings: No bruising.   Neurological:      General: No focal deficit present.      Mental Status: She is alert and oriented to person, place, and time. Mental status is at baseline.      Gait: Gait normal.   Psychiatric:         Mood and Affect: Mood normal.         Behavior: Behavior normal.           Assessment & Plan  1. Essential (primary) hypertension  -     amLODIPine (NORVASC) 5 MG tablet; Take 1 tablet (5 mg total) by mouth once daily.  Dispense: 90 tablet; Refill: 3  Refill given.   Blood pressure goal <140/90, recommend DASH diet, record BP at home daily and bring log to next office visit to assure that home cuff is calibrated at minimum every 12 months, continue current medication regimen.      2. PE (pulmonary thromboembolism)  Given strong family history of PE (her sister  from it) and heart disease. Will cont Xarelto for the next 6 months. Side effects discussed with patient.   -     rivaroxaban (XARELTO) 20 mg Tab; Take 1 tablet (20 mg total) by mouth daily with dinner or evening meal.  Dispense: 90 tablet; Refill: 3           Follow up in about 6 months (around 2026) for  Wellness.         [1]   Current Outpatient Medications   Medication Sig Dispense Refill    baclofen (LIORESAL) 10 MG tablet TAKE 1 TABLET BY MOUTH THREE TIMES A DAY 90 tablet 0    cyanocobalamin (VITAMIN B-12) 1000 MCG tablet Take 100 mcg by mouth once daily.      ketoconazole (NIZORAL) 2 % cream Apply topically 2 (two) times daily.      metFORMIN (GLUCOPHAGE-XR) 500 MG ER 24hr tablet Take 1 tablet (500 mg total) by mouth Daily. 90 tablet 3    multivitamin with minerals tablet Take 1 tablet by mouth once daily. Iron supplement PRN      amLODIPine (NORVASC) 5 MG tablet Take 1 tablet (5 mg total) by mouth once daily. 90 tablet 3    rivaroxaban (XARELTO) 20 mg Tab Take 1 tablet (20 mg total) by mouth daily with dinner or evening meal. 90 tablet 3     No current facility-administered medications for this visit.